# Patient Record
Sex: FEMALE | Race: BLACK OR AFRICAN AMERICAN | NOT HISPANIC OR LATINO | Employment: OTHER | URBAN - METROPOLITAN AREA
[De-identification: names, ages, dates, MRNs, and addresses within clinical notes are randomized per-mention and may not be internally consistent; named-entity substitution may affect disease eponyms.]

---

## 2023-03-02 PROBLEM — M54.50 LOWER BACK PAIN: Status: ACTIVE | Noted: 2023-03-02

## 2023-03-02 PROBLEM — J20.9 ACUTE BRONCHITIS WITH BRONCHOSPASM: Status: ACTIVE | Noted: 2023-03-02

## 2023-03-02 PROBLEM — M25.569 JOINT PAIN, KNEE: Status: RESOLVED | Noted: 2023-03-02 | Resolved: 2023-03-02

## 2023-03-02 PROBLEM — E87.6 HYPOKALEMIA: Status: ACTIVE | Noted: 2023-03-02

## 2023-03-02 PROBLEM — M25.569 JOINT PAIN, KNEE: Status: ACTIVE | Noted: 2023-03-02

## 2023-03-02 PROBLEM — K22.70 BARRETT'S ESOPHAGUS WITHOUT DYSPLASIA: Status: ACTIVE | Noted: 2023-03-02

## 2023-03-02 PROBLEM — N39.0 UTI (URINARY TRACT INFECTION): Status: ACTIVE | Noted: 2023-03-02

## 2023-03-02 PROBLEM — I50.9 CHF (CONGESTIVE HEART FAILURE) (MULTI): Status: ACTIVE | Noted: 2023-03-02

## 2023-03-02 PROBLEM — R79.1 SUBTHERAPEUTIC INTERNATIONAL NORMALIZED RATIO (INR): Status: ACTIVE | Noted: 2023-03-02

## 2023-03-02 PROBLEM — J40 BRONCHITIS: Status: ACTIVE | Noted: 2023-03-02

## 2023-03-02 PROBLEM — G47.30 SLEEP DISORDER BREATHING: Status: ACTIVE | Noted: 2023-03-02

## 2023-03-02 PROBLEM — M25.572 LEFT ANKLE PAIN: Status: ACTIVE | Noted: 2023-03-02

## 2023-03-02 PROBLEM — N18.30 STAGE III CHRONIC KIDNEY DISEASE (MULTI): Status: ACTIVE | Noted: 2023-03-02

## 2023-03-02 PROBLEM — I48.91 ATRIAL FIBRILLATION (MULTI): Status: ACTIVE | Noted: 2023-03-02

## 2023-03-02 PROBLEM — J32.9 SINUSITIS, BACTERIAL: Status: ACTIVE | Noted: 2023-03-02

## 2023-03-02 PROBLEM — S80.00XA CONTUSION OF KNEE: Status: RESOLVED | Noted: 2023-03-02 | Resolved: 2023-03-02

## 2023-03-02 PROBLEM — R25.2 LEG CRAMPS: Status: ACTIVE | Noted: 2023-03-02

## 2023-03-02 PROBLEM — E78.49 FAMILIAL COMBINED HYPERLIPIDEMIA: Status: ACTIVE | Noted: 2023-03-02

## 2023-03-02 PROBLEM — I10 HYPERTENSION: Status: ACTIVE | Noted: 2023-03-02

## 2023-03-02 PROBLEM — G47.33 OSA (OBSTRUCTIVE SLEEP APNEA): Status: ACTIVE | Noted: 2023-03-02

## 2023-03-02 PROBLEM — B02.29 NEURALGIA, POST-HERPETIC: Status: ACTIVE | Noted: 2023-03-02

## 2023-03-02 PROBLEM — B37.0 CANDIDIASIS OF MOUTH: Status: RESOLVED | Noted: 2023-03-02 | Resolved: 2023-03-02

## 2023-03-02 PROBLEM — Z96.652 S/P TKR (TOTAL KNEE REPLACEMENT) USING CEMENT, LEFT: Status: ACTIVE | Noted: 2023-03-02

## 2023-03-02 PROBLEM — M71.20 BAKERS CYST: Status: ACTIVE | Noted: 2023-03-02

## 2023-03-02 PROBLEM — T82.867A: Status: ACTIVE | Noted: 2023-03-02

## 2023-03-02 PROBLEM — B37.0 CANDIDIASIS OF MOUTH: Status: ACTIVE | Noted: 2023-03-02

## 2023-03-02 PROBLEM — Z86.718 HISTORY OF BLOOD CLOTS: Status: ACTIVE | Noted: 2023-03-02

## 2023-03-02 PROBLEM — I82.90 THROMBUS: Status: ACTIVE | Noted: 2023-03-02

## 2023-03-02 PROBLEM — R60.0 LEG EDEMA: Status: ACTIVE | Noted: 2023-03-02

## 2023-03-02 PROBLEM — N64.4 BREAST TENDERNESS: Status: ACTIVE | Noted: 2023-03-02

## 2023-03-02 PROBLEM — L03.90 CELLULITIS: Status: ACTIVE | Noted: 2023-03-02

## 2023-03-02 PROBLEM — Z98.84 BARIATRIC SURGERY STATUS: Status: ACTIVE | Noted: 2023-03-02

## 2023-03-02 PROBLEM — R25.2 HAND CRAMPS: Status: ACTIVE | Noted: 2023-03-02

## 2023-03-02 PROBLEM — R06.83 SNORING: Status: ACTIVE | Noted: 2023-03-02

## 2023-03-02 PROBLEM — M17.11 OSTEOARTHROSIS, LOCALIZED, PRIMARY, KNEE, RIGHT: Status: ACTIVE | Noted: 2023-03-02

## 2023-03-02 PROBLEM — B96.89 SINUSITIS, BACTERIAL: Status: ACTIVE | Noted: 2023-03-02

## 2023-03-02 PROBLEM — N95.1 HOT FLASHES DUE TO MENOPAUSE: Status: ACTIVE | Noted: 2023-03-02

## 2023-03-02 PROBLEM — I82.409 ACUTE EMBOLISM AND THROMBOSIS OF UNSPECIFIED DEEP VEINS OF UNSPECIFIED LOWER EXTREMITY (MULTI): Status: ACTIVE | Noted: 2023-03-02

## 2023-03-02 PROBLEM — M25.512 LEFT SHOULDER PAIN: Status: ACTIVE | Noted: 2023-03-02

## 2023-03-02 PROBLEM — R61 NIGHT SWEATS: Status: ACTIVE | Noted: 2023-03-02

## 2023-03-02 PROBLEM — I26.99 PULMONARY EMBOLISM (MULTI): Status: ACTIVE | Noted: 2023-03-02

## 2023-03-02 PROBLEM — M76.829 POSTERIOR TIBIALIS TENDON INSUFFICIENCY: Status: ACTIVE | Noted: 2023-03-02

## 2023-03-02 PROBLEM — S90.01XA CONTUSION OF ANKLE, RIGHT: Status: ACTIVE | Noted: 2023-03-02

## 2023-03-02 PROBLEM — M19.90 OSTEOARTHROSIS: Status: RESOLVED | Noted: 2023-03-02 | Resolved: 2023-03-02

## 2023-03-02 PROBLEM — J01.90 ACUTE SINUSITIS: Status: ACTIVE | Noted: 2023-03-02

## 2023-03-02 PROBLEM — S90.00XA CONTUSION OF ANKLE: Status: RESOLVED | Noted: 2023-03-02 | Resolved: 2023-03-02

## 2023-03-02 PROBLEM — I87.8 CHRONIC VENOUS STASIS: Status: ACTIVE | Noted: 2023-03-02

## 2023-03-02 PROBLEM — B37.0 THRUSH, ORAL: Status: ACTIVE | Noted: 2023-03-02

## 2023-03-02 PROBLEM — L25.9 CONTACT DERMATITIS: Status: ACTIVE | Noted: 2023-03-02

## 2023-03-02 PROBLEM — F11.90 OPIATE USE: Status: ACTIVE | Noted: 2023-03-02

## 2023-03-02 PROBLEM — S90.00XA CONTUSION OF ANKLE: Status: ACTIVE | Noted: 2023-03-02

## 2023-03-02 PROBLEM — F54 PSYCHOLOGICAL FACTORS AFFECTING MEDICAL CONDITION: Status: ACTIVE | Noted: 2023-03-02

## 2023-03-02 PROBLEM — M23.305 DERANGEMENT OF MEDIAL MENISCUS: Status: ACTIVE | Noted: 2023-03-02

## 2023-03-02 PROBLEM — E08.311 ADVANCED DIABETIC MACULOPATHY WITH RETINOPATHY AND MACULAR EDEMA ASSOCIATED WITH DIABETES MELLITUS DUE TO UNDERLYING CONDITION (MULTI): Status: ACTIVE | Noted: 2023-03-02

## 2023-03-02 PROBLEM — S80.02XA CONTUSION OF KNEE, LEFT: Status: ACTIVE | Noted: 2023-03-02

## 2023-03-02 PROBLEM — R12 HEARTBURN: Status: ACTIVE | Noted: 2023-03-02

## 2023-03-02 PROBLEM — K04.7 TOOTH INFECTION: Status: ACTIVE | Noted: 2023-03-02

## 2023-03-02 PROBLEM — U07.1 COVID-19 VIRUS INFECTION: Status: ACTIVE | Noted: 2023-03-02

## 2023-03-02 PROBLEM — M79.603 ARM PAIN: Status: ACTIVE | Noted: 2023-03-02

## 2023-03-02 PROBLEM — E78.5 HYPERLIPIDEMIA: Status: ACTIVE | Noted: 2023-03-02
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
## 2023-03-02 PROBLEM — B49 FUNGAL INFECTION: Status: ACTIVE | Noted: 2023-03-02

## 2023-03-02 PROBLEM — E66.01 OBESITY, MORBID (MULTI): Status: ACTIVE | Noted: 2023-03-02

## 2023-03-02 PROBLEM — M23.302 DERANGEMENT OF LATERAL MENISCUS: Status: ACTIVE | Noted: 2023-03-02

## 2023-03-02 PROBLEM — B49 INFECTION DUE TO FUNGUS: Status: ACTIVE | Noted: 2023-03-02

## 2023-03-02 PROBLEM — J30.9 ALLERGIC RHINITIS: Status: ACTIVE | Noted: 2023-03-02

## 2023-03-02 PROBLEM — M19.90 OSTEOARTHROSIS: Status: ACTIVE | Noted: 2023-03-02

## 2023-03-02 PROBLEM — R53.83 FATIGUE: Status: ACTIVE | Noted: 2023-03-02

## 2023-03-02 PROBLEM — B49 INFECTION DUE TO FUNGUS: Status: RESOLVED | Noted: 2023-03-02 | Resolved: 2023-03-02

## 2023-03-02 PROBLEM — R60.0 EDEMA OF LOWER EXTREMITY: Status: ACTIVE | Noted: 2023-03-02

## 2023-03-02 PROBLEM — K02.9 DENTAL CARIES: Status: ACTIVE | Noted: 2023-03-02

## 2023-03-02 PROBLEM — B02.9 SHINGLES: Status: ACTIVE | Noted: 2023-03-02

## 2023-03-02 PROBLEM — D68.9 COAGULOPATHY (MULTI): Status: ACTIVE | Noted: 2023-03-02

## 2023-03-02 PROBLEM — S80.00XA CONTUSION OF KNEE: Status: ACTIVE | Noted: 2023-03-02

## 2023-03-02 PROBLEM — N64.4 BREAST PAIN, RIGHT: Status: ACTIVE | Noted: 2023-03-02

## 2023-03-02 PROBLEM — E53.8 VITAMIN B12 DEFICIENCY: Status: ACTIVE | Noted: 2023-03-02

## 2023-03-02 PROBLEM — K44.9 HERNIA, HIATAL: Status: ACTIVE | Noted: 2023-03-02

## 2023-03-02 PROBLEM — M17.9 OSTEOARTHRITIS OF KNEE: Status: ACTIVE | Noted: 2023-03-02

## 2023-03-02 PROBLEM — A08.4 VIRAL GASTROENTERITIS: Status: ACTIVE | Noted: 2023-03-02

## 2023-03-02 PROBLEM — G47.19 EXCESSIVE DAYTIME SLEEPINESS: Status: ACTIVE | Noted: 2023-03-02

## 2023-03-02 RX ORDER — OMEPRAZOLE 20 MG/1
1 CAPSULE, DELAYED RELEASE ORAL DAILY
COMMUNITY
Start: 2020-02-06 | End: 2023-03-14 | Stop reason: SDUPTHER

## 2023-03-02 RX ORDER — ELECTROLYTES/DEXTROSE
1 SOLUTION, ORAL ORAL 2 TIMES DAILY
COMMUNITY

## 2023-03-02 RX ORDER — AMLODIPINE BESYLATE 10 MG/1
1 TABLET ORAL DAILY
COMMUNITY
Start: 2015-01-07 | End: 2023-05-17

## 2023-03-02 RX ORDER — TIZANIDINE 4 MG/1
1 TABLET ORAL 2 TIMES DAILY
COMMUNITY
Start: 2022-07-06 | End: 2023-05-03 | Stop reason: SDUPTHER

## 2023-03-02 RX ORDER — WARFARIN SODIUM 5 MG/1
5 TABLET ORAL DAILY
COMMUNITY
Start: 2021-04-27 | End: 2023-05-03 | Stop reason: SDUPTHER

## 2023-03-02 RX ORDER — POTASSIUM CHLORIDE 20 MEQ/1
2 TABLET, EXTENDED RELEASE ORAL 2 TIMES DAILY
COMMUNITY
Start: 2014-03-13 | End: 2024-02-15 | Stop reason: SDUPTHER

## 2023-03-02 RX ORDER — CLONIDINE HYDROCHLORIDE 0.2 MG/1
1 TABLET ORAL 2 TIMES DAILY
COMMUNITY
Start: 2014-07-28 | End: 2023-05-17

## 2023-03-02 RX ORDER — FLUTICASONE PROPIONATE 50 MCG
1 SPRAY, SUSPENSION (ML) NASAL 2 TIMES DAILY
COMMUNITY
Start: 2019-04-05 | End: 2023-10-10 | Stop reason: SDUPTHER

## 2023-03-02 RX ORDER — METHYLPREDNISOLONE 4 MG/1
TABLET ORAL
COMMUNITY
End: 2023-07-18 | Stop reason: ALTCHOICE

## 2023-03-02 RX ORDER — ALBUTEROL SULFATE 90 UG/1
2 AEROSOL, METERED RESPIRATORY (INHALATION)
COMMUNITY
Start: 2017-09-05 | End: 2023-10-30 | Stop reason: SDUPTHER

## 2023-03-02 RX ORDER — ASPIRIN 81 MG/1
1 TABLET ORAL DAILY
COMMUNITY
Start: 2012-02-23

## 2023-03-02 RX ORDER — HYDROCHLOROTHIAZIDE 25 MG/1
1 TABLET ORAL DAILY
COMMUNITY
Start: 2012-07-30 | End: 2023-03-30 | Stop reason: SDUPTHER

## 2023-03-02 RX ORDER — ATORVASTATIN CALCIUM 10 MG/1
1 TABLET, FILM COATED ORAL DAILY
COMMUNITY
Start: 2019-02-18

## 2023-03-02 RX ORDER — MINERAL OIL
180 ENEMA (ML) RECTAL DAILY
COMMUNITY

## 2023-03-02 RX ORDER — SPIRONOLACTONE 25 MG/1
1 TABLET ORAL DAILY
COMMUNITY
Start: 2021-04-30 | End: 2023-05-24 | Stop reason: SDUPTHER

## 2023-03-14 DIAGNOSIS — I10 HYPERTENSION, UNSPECIFIED TYPE: ICD-10-CM

## 2023-03-14 DIAGNOSIS — R12 HEARTBURN: ICD-10-CM

## 2023-03-15 RX ORDER — OMEPRAZOLE 20 MG/1
20 CAPSULE, DELAYED RELEASE ORAL DAILY
Qty: 90 CAPSULE | Refills: 3 | Status: SHIPPED | OUTPATIENT
Start: 2023-03-15

## 2023-03-29 ENCOUNTER — APPOINTMENT (OUTPATIENT)
Dept: PRIMARY CARE | Facility: CLINIC | Age: 68
End: 2023-03-29
Payer: MEDICARE

## 2023-03-29 DIAGNOSIS — I10 HYPERTENSION, UNSPECIFIED TYPE: ICD-10-CM

## 2023-03-29 LAB
ANION GAP IN SER/PLAS: 16 MMOL/L (ref 10–20)
CALCIUM (MG/DL) IN SER/PLAS: 9.9 MG/DL (ref 8.6–10.6)
CARBON DIOXIDE, TOTAL (MMOL/L) IN SER/PLAS: 24 MMOL/L (ref 21–32)
CHLORIDE (MMOL/L) IN SER/PLAS: 106 MMOL/L (ref 98–107)
CREATININE (MG/DL) IN SER/PLAS: 1.17 MG/DL (ref 0.5–1.05)
GFR FEMALE: 51 ML/MIN/1.73M2
GLUCOSE (MG/DL) IN SER/PLAS: 84 MG/DL (ref 74–99)
POTASSIUM (MMOL/L) IN SER/PLAS: 4 MMOL/L (ref 3.5–5.3)
SODIUM (MMOL/L) IN SER/PLAS: 142 MMOL/L (ref 136–145)
UREA NITROGEN (MG/DL) IN SER/PLAS: 15 MG/DL (ref 6–23)

## 2023-03-29 PROCEDURE — 36415 COLL VENOUS BLD VENIPUNCTURE: CPT | Performed by: INTERNAL MEDICINE

## 2023-03-29 PROCEDURE — 80048 BASIC METABOLIC PNL TOTAL CA: CPT

## 2023-03-30 ENCOUNTER — APPOINTMENT (OUTPATIENT)
Dept: PRIMARY CARE | Facility: CLINIC | Age: 68
End: 2023-03-30
Payer: MEDICARE

## 2023-03-30 DIAGNOSIS — I10 HYPERTENSION, UNSPECIFIED TYPE: ICD-10-CM

## 2023-03-31 RX ORDER — HYDROCHLOROTHIAZIDE 25 MG/1
25 TABLET ORAL DAILY
Qty: 90 TABLET | Refills: 2 | Status: SHIPPED | OUTPATIENT
Start: 2023-03-31 | End: 2023-10-30 | Stop reason: ALTCHOICE

## 2023-04-12 ENCOUNTER — OFFICE VISIT (OUTPATIENT)
Dept: PRIMARY CARE | Facility: CLINIC | Age: 68
End: 2023-04-12
Payer: MEDICARE

## 2023-04-12 VITALS
HEART RATE: 76 BPM | WEIGHT: 201 LBS | RESPIRATION RATE: 12 BRPM | HEIGHT: 65 IN | DIASTOLIC BLOOD PRESSURE: 78 MMHG | OXYGEN SATURATION: 98 % | SYSTOLIC BLOOD PRESSURE: 124 MMHG | BODY MASS INDEX: 33.49 KG/M2

## 2023-04-12 DIAGNOSIS — G47.33 OSA (OBSTRUCTIVE SLEEP APNEA): ICD-10-CM

## 2023-04-12 DIAGNOSIS — I50.9 CONGESTIVE HEART FAILURE, UNSPECIFIED HF CHRONICITY, UNSPECIFIED HEART FAILURE TYPE (MULTI): ICD-10-CM

## 2023-04-12 DIAGNOSIS — I48.91 ATRIAL FIBRILLATION, UNSPECIFIED TYPE (MULTI): ICD-10-CM

## 2023-04-12 DIAGNOSIS — Z51.81 ENCOUNTER FOR MONITORING COUMADIN THERAPY: ICD-10-CM

## 2023-04-12 DIAGNOSIS — I82.409 ACUTE EMBOLISM AND THROMBOSIS OF DEEP VEIN OF LOWER EXTREMITY, UNSPECIFIED LATERALITY (MULTI): Primary | ICD-10-CM

## 2023-04-12 DIAGNOSIS — I15.9 SECONDARY HYPERTENSION: ICD-10-CM

## 2023-04-12 DIAGNOSIS — E78.5 HYPERLIPIDEMIA, UNSPECIFIED HYPERLIPIDEMIA TYPE: ICD-10-CM

## 2023-04-12 DIAGNOSIS — Z79.01 ENCOUNTER FOR MONITORING COUMADIN THERAPY: ICD-10-CM

## 2023-04-12 LAB
POC INR: 2.1 (ref 0.9–1.1)
POC PROTHROMBIN TIME: 24.8 (ref 9.3–12.5)

## 2023-04-12 PROCEDURE — 3074F SYST BP LT 130 MM HG: CPT | Performed by: INTERNAL MEDICINE

## 2023-04-12 PROCEDURE — 85610 PROTHROMBIN TIME: CPT | Performed by: INTERNAL MEDICINE

## 2023-04-12 PROCEDURE — 99214 OFFICE O/P EST MOD 30 MIN: CPT | Performed by: INTERNAL MEDICINE

## 2023-04-12 PROCEDURE — 3078F DIAST BP <80 MM HG: CPT | Performed by: INTERNAL MEDICINE

## 2023-04-12 PROCEDURE — 1159F MED LIST DOCD IN RCRD: CPT | Performed by: INTERNAL MEDICINE

## 2023-04-12 PROCEDURE — 1036F TOBACCO NON-USER: CPT | Performed by: INTERNAL MEDICINE

## 2023-04-12 ASSESSMENT — PAIN SCALES - GENERAL: PAINLEVEL: 0-NO PAIN

## 2023-04-12 NOTE — PROGRESS NOTES
"Subjective   Chief complaint: Halima Mckeon is a 67 y.o. female who presents for Blood work followup  and INR check .    HPI:  Patient is here for a Coumadin treatment patient has A-fib with history of PE and a DVT and she has been on Coumadin lifetime her INR is 2.0 therapeutic  Patient complaining of severe arthritic pain in her knee and her hip and she would like to have a Depo-Medrol shot.  Patient also she is complaining of being weak and she would like to have a B12 with a history of B12 anemia        Objective   /78 (BP Location: Right arm, Patient Position: Sitting, BP Cuff Size: Adult)   Pulse 76   Resp 12   Ht 1.651 m (5' 5\")   Wt 91.2 kg (201 lb)   SpO2 98%   BMI 33.45 kg/m²   Physical Exam  Vitals reviewed.   Constitutional:       Appearance: Normal appearance.   HENT:      Head: Normocephalic and atraumatic.   Cardiovascular:      Rate and Rhythm: Normal rate and regular rhythm.   Pulmonary:      Effort: Pulmonary effort is normal.      Breath sounds: Normal breath sounds.   Abdominal:      General: Bowel sounds are normal.      Palpations: Abdomen is soft.   Musculoskeletal:      Cervical back: Neck supple.   Skin:     General: Skin is warm and dry.   Neurological:      General: No focal deficit present.      Mental Status: She is alert.   Psychiatric:         Mood and Affect: Mood normal.         Behavior: Behavior is cooperative.         I have reviewed and reconciled the medication list with the patient today.   Current Outpatient Medications:     albuterol 90 mcg/actuation inhaler, Inhale 2 puffs. EVERY FOUR TO SIX HOURS AS NEEDED, Disp: , Rfl:     amLODIPine (Norvasc) 10 mg tablet, Take 1 tablet (10 mg) by mouth once daily., Disp: , Rfl:     aspirin 81 mg EC tablet, Take 1 tablet (81 mg) by mouth once daily., Disp: , Rfl:     atorvastatin (Lipitor) 10 mg tablet, Take 1 tablet (10 mg) by mouth once daily., Disp: , Rfl:     cloNIDine (Catapres) 0.2 mg tablet, Take 1 tablet (0.2 mg) by " mouth in the morning and 1 tablet (0.2 mg) before bedtime., Disp: , Rfl:     fexofenadine (Allegra) 180 mg tablet, Take 1 tablet (180 mg) by mouth once daily., Disp: , Rfl:     fluticasone (Flonase) 50 mcg/actuation nasal spray, Administer 1 spray into each nostril in the morning and 1 spray before bedtime., Disp: , Rfl:     hydroCHLOROthiazide (HYDRODiuril) 25 mg tablet, Take 1 tablet (25 mg) by mouth once daily., Disp: 90 tablet, Rfl: 2    hydrocortisone (hydrocortisone-aloe vera) 1 % cream, Apply 1 Application topically in the morning and 1 Application before bedtime., Disp: , Rfl:     potassium chloride CR (Klor-Con M20) 20 mEq ER tablet, Take 2 tablets (40 mEq) by mouth in the morning and 2 tablets (40 mEq) before bedtime., Disp: , Rfl:     spironolactone (Aldactone) 25 mg tablet, Take 1 tablet (25 mg) by mouth once daily., Disp: , Rfl:     tiZANidine (Zanaflex) 4 mg tablet, Take 1 tablet (4 mg) by mouth in the morning and 1 tablet (4 mg) before bedtime., Disp: , Rfl:     warfarin (Coumadin) 5 mg tablet, Take 1 tablet (5 mg) by mouth daily. TAKE 1 1/2 TABS (7.5MG) EVERY TUES AND 1 TAB (5MG) THE REST OF THE WEEK, Disp: , Rfl:     methylPREDNISolone (Medrol) 4 mg tablet, Take by mouth., Disp: , Rfl:     omeprazole (PriLOSEC) 20 mg DR capsule, Take 1 capsule (20 mg) by mouth once daily. (Patient not taking: Reported on 4/12/2023), Disp: 90 capsule, Rfl: 3     Imaging:  No results found.     Labs reviewed:    Lab Results   Component Value Date    WBC 6.7 01/11/2023    HGB 13.2 01/11/2023    HCT 42.1 01/11/2023     01/11/2023    CHOL 201 (H) 01/11/2023    TRIG 86 01/11/2023    HDL 60.8 01/11/2023    ALT 17 01/11/2023    AST 20 01/11/2023     03/29/2023    K 4.0 03/29/2023     03/29/2023    CREATININE 1.17 (H) 03/29/2023    BUN 15 03/29/2023    CO2 24 03/29/2023    TSH 1.07 05/14/2022    INR 2.1 (A) 04/12/2023    HGBA1C 5.8 07/27/2021       Assessment/Plan   Problem List Items Addressed This Visit           Nervous    MENG (obstructive sleep apnea)       Circulatory    Acute embolism and thrombosis of unspecified deep veins of unspecified lower extremity (CMS/HCC) - Primary     Continue Coumadin         Atrial fibrillation (CMS/HCC)     INR therapeutic continue the same Coumadin dose  INR in 1 month         CHF (congestive heart failure) (CMS/HCC)     Follow low-salt diet  Continue Lasix  Fluid restriction  Elevate the         Hypertension     Blood pressure under control  Amlodipine and  Clonidine  Aldactone  Low-salt diet  Fluid restriction            Other    Hyperlipidemia     Low-fat diet  Statin         Encounter for monitoring Coumadin therapy    Relevant Orders    POCT INR manually resulted (Completed)       Continue current medications as listed  Follow up in In May for  complete physical  INR

## 2023-04-12 NOTE — ASSESSMENT & PLAN NOTE
Blood pressure under control  Amlodipine and  Clonidine  Aldactone  Low-salt diet  Fluid restriction

## 2023-05-03 DIAGNOSIS — I48.91 ATRIAL FIBRILLATION, UNSPECIFIED TYPE (MULTI): ICD-10-CM

## 2023-05-03 DIAGNOSIS — M54.50 LOW BACK PAIN, UNSPECIFIED BACK PAIN LATERALITY, UNSPECIFIED CHRONICITY, UNSPECIFIED WHETHER SCIATICA PRESENT: ICD-10-CM

## 2023-05-03 RX ORDER — TIZANIDINE 4 MG/1
4 TABLET ORAL 2 TIMES DAILY
Qty: 30 TABLET | Refills: 1 | Status: SHIPPED | OUTPATIENT
Start: 2023-05-03 | End: 2023-10-30 | Stop reason: SDUPTHER

## 2023-05-03 RX ORDER — WARFARIN SODIUM 5 MG/1
5 TABLET ORAL NIGHTLY
Qty: 90 TABLET | Refills: 3 | Status: SHIPPED | OUTPATIENT
Start: 2023-05-03 | End: 2023-05-17 | Stop reason: SDUPTHER

## 2023-05-16 DIAGNOSIS — I48.91 ATRIAL FIBRILLATION, UNSPECIFIED TYPE (MULTI): ICD-10-CM

## 2023-05-16 DIAGNOSIS — I15.9 SECONDARY HYPERTENSION: ICD-10-CM

## 2023-05-17 RX ORDER — AMLODIPINE BESYLATE 10 MG/1
TABLET ORAL
Qty: 90 TABLET | Refills: 3 | Status: SHIPPED | OUTPATIENT
Start: 2023-05-17 | End: 2023-10-30 | Stop reason: SDUPTHER

## 2023-05-17 RX ORDER — CLONIDINE HYDROCHLORIDE 0.2 MG/1
TABLET ORAL
Qty: 180 TABLET | Refills: 3 | Status: SHIPPED | OUTPATIENT
Start: 2023-05-17

## 2023-05-17 RX ORDER — WARFARIN SODIUM 5 MG/1
5 TABLET ORAL NIGHTLY
Qty: 90 TABLET | Refills: 3 | Status: SHIPPED | OUTPATIENT
Start: 2023-05-17 | End: 2023-07-19 | Stop reason: SDUPTHER

## 2023-05-18 ENCOUNTER — APPOINTMENT (OUTPATIENT)
Dept: PRIMARY CARE | Facility: CLINIC | Age: 68
End: 2023-05-18
Payer: MEDICARE

## 2023-05-23 DIAGNOSIS — E55.9 VITAMIN D DEFICIENCY: ICD-10-CM

## 2023-05-23 DIAGNOSIS — N18.31 STAGE 3A CHRONIC KIDNEY DISEASE (MULTI): ICD-10-CM

## 2023-05-23 DIAGNOSIS — Z00.00 ENCOUNTER FOR SUBSEQUENT ANNUAL WELLNESS VISIT (AWV) IN MEDICARE PATIENT: ICD-10-CM

## 2023-05-23 DIAGNOSIS — I50.9 CONGESTIVE HEART FAILURE, UNSPECIFIED HF CHRONICITY, UNSPECIFIED HEART FAILURE TYPE (MULTI): ICD-10-CM

## 2023-05-24 DIAGNOSIS — I15.9 SECONDARY HYPERTENSION: ICD-10-CM

## 2023-05-24 RX ORDER — SPIRONOLACTONE 25 MG/1
25 TABLET ORAL DAILY
Qty: 90 TABLET | Refills: 3 | Status: SHIPPED | OUTPATIENT
Start: 2023-05-24 | End: 2023-10-30 | Stop reason: ALTCHOICE

## 2023-05-25 ENCOUNTER — APPOINTMENT (OUTPATIENT)
Dept: PRIMARY CARE | Facility: CLINIC | Age: 68
End: 2023-05-25
Payer: MEDICARE

## 2023-06-05 ENCOUNTER — APPOINTMENT (OUTPATIENT)
Dept: PRIMARY CARE | Facility: CLINIC | Age: 68
End: 2023-06-05
Payer: MEDICARE

## 2023-06-06 DIAGNOSIS — Z00.00 ENCOUNTER FOR SUBSEQUENT ANNUAL WELLNESS VISIT (AWV) IN MEDICARE PATIENT: ICD-10-CM

## 2023-06-12 ENCOUNTER — APPOINTMENT (OUTPATIENT)
Dept: PRIMARY CARE | Facility: CLINIC | Age: 68
End: 2023-06-12
Payer: MEDICARE

## 2023-06-12 DIAGNOSIS — I50.9 CONGESTIVE HEART FAILURE, UNSPECIFIED HF CHRONICITY, UNSPECIFIED HEART FAILURE TYPE (MULTI): ICD-10-CM

## 2023-06-12 DIAGNOSIS — Z00.00 ENCOUNTER FOR SUBSEQUENT ANNUAL WELLNESS VISIT (AWV) IN MEDICARE PATIENT: ICD-10-CM

## 2023-06-12 DIAGNOSIS — E08.311 ADVANCED DIABETIC MACULOPATHY WITH RETINOPATHY AND MACULAR EDEMA ASSOCIATED WITH DIABETES MELLITUS DUE TO UNDERLYING CONDITION (MULTI): ICD-10-CM

## 2023-06-12 DIAGNOSIS — N18.31 STAGE 3A CHRONIC KIDNEY DISEASE (MULTI): ICD-10-CM

## 2023-06-12 DIAGNOSIS — I15.9 SECONDARY HYPERTENSION: ICD-10-CM

## 2023-06-26 DIAGNOSIS — I48.91 ATRIAL FIBRILLATION, UNSPECIFIED TYPE (MULTI): ICD-10-CM

## 2023-06-26 DIAGNOSIS — Z00.00 ENCOUNTER FOR SUBSEQUENT ANNUAL WELLNESS VISIT (AWV) IN MEDICARE PATIENT: ICD-10-CM

## 2023-06-26 DIAGNOSIS — E55.9 VITAMIN D DEFICIENCY: ICD-10-CM

## 2023-06-26 DIAGNOSIS — I50.9 CONGESTIVE HEART FAILURE, UNSPECIFIED HF CHRONICITY, UNSPECIFIED HEART FAILURE TYPE (MULTI): ICD-10-CM

## 2023-06-27 ENCOUNTER — APPOINTMENT (OUTPATIENT)
Dept: PRIMARY CARE | Facility: CLINIC | Age: 68
End: 2023-06-27
Payer: MEDICARE

## 2023-07-18 ENCOUNTER — OFFICE VISIT (OUTPATIENT)
Dept: PRIMARY CARE | Facility: CLINIC | Age: 68
End: 2023-07-18
Payer: MEDICARE

## 2023-07-18 VITALS
SYSTOLIC BLOOD PRESSURE: 120 MMHG | BODY MASS INDEX: 33.12 KG/M2 | HEART RATE: 82 BPM | RESPIRATION RATE: 18 BRPM | WEIGHT: 199 LBS | OXYGEN SATURATION: 95 % | DIASTOLIC BLOOD PRESSURE: 90 MMHG | TEMPERATURE: 97.6 F

## 2023-07-18 DIAGNOSIS — I15.9 SECONDARY HYPERTENSION: ICD-10-CM

## 2023-07-18 DIAGNOSIS — N18.32 STAGE 3B CHRONIC KIDNEY DISEASE (MULTI): ICD-10-CM

## 2023-07-18 DIAGNOSIS — I50.9 CONGESTIVE HEART FAILURE, UNSPECIFIED HF CHRONICITY, UNSPECIFIED HEART FAILURE TYPE (MULTI): Primary | ICD-10-CM

## 2023-07-18 DIAGNOSIS — I48.91 ATRIAL FIBRILLATION, UNSPECIFIED TYPE (MULTI): ICD-10-CM

## 2023-07-18 DIAGNOSIS — Z86.718 HISTORY OF BLOOD CLOTS: ICD-10-CM

## 2023-07-18 DIAGNOSIS — G89.29 CHRONIC PAIN OF LEFT ANKLE: ICD-10-CM

## 2023-07-18 DIAGNOSIS — Z00.00 ENCOUNTER FOR SUBSEQUENT ANNUAL WELLNESS VISIT (AWV) IN MEDICARE PATIENT: ICD-10-CM

## 2023-07-18 DIAGNOSIS — M25.572 CHRONIC PAIN OF LEFT ANKLE: ICD-10-CM

## 2023-07-18 DIAGNOSIS — E78.5 HYPERLIPIDEMIA, UNSPECIFIED HYPERLIPIDEMIA TYPE: ICD-10-CM

## 2023-07-18 LAB
POC APPEARANCE, URINE: CLEAR
POC BILIRUBIN, URINE: NEGATIVE
POC BLOOD, URINE: NEGATIVE
POC COLOR, URINE: YELLOW
POC GLUCOSE, URINE: NEGATIVE MG/DL
POC INR: 2.5 (ref 0.9–1.1)
POC KETONES, URINE: NEGATIVE MG/DL
POC LEUKOCYTES, URINE: NEGATIVE
POC NITRITE,URINE: NEGATIVE
POC PH, URINE: 5 PH
POC PROTEIN, URINE: NEGATIVE MG/DL
POC PROTHROMBIN TIME: 29.9 (ref 9.3–12.5)
POC SPECIFIC GRAVITY, URINE: <=1.005
POC UROBILINOGEN, URINE: 0.2 EU/DL

## 2023-07-18 PROCEDURE — G0439 PPPS, SUBSEQ VISIT: HCPCS | Performed by: INTERNAL MEDICINE

## 2023-07-18 PROCEDURE — 3080F DIAST BP >= 90 MM HG: CPT | Performed by: INTERNAL MEDICINE

## 2023-07-18 PROCEDURE — 93000 ELECTROCARDIOGRAM COMPLETE: CPT | Performed by: INTERNAL MEDICINE

## 2023-07-18 PROCEDURE — 1125F AMNT PAIN NOTED PAIN PRSNT: CPT | Performed by: INTERNAL MEDICINE

## 2023-07-18 PROCEDURE — 1036F TOBACCO NON-USER: CPT | Performed by: INTERNAL MEDICINE

## 2023-07-18 PROCEDURE — 85610 PROTHROMBIN TIME: CPT | Performed by: INTERNAL MEDICINE

## 2023-07-18 PROCEDURE — 1159F MED LIST DOCD IN RCRD: CPT | Performed by: INTERNAL MEDICINE

## 2023-07-18 PROCEDURE — 99214 OFFICE O/P EST MOD 30 MIN: CPT | Performed by: INTERNAL MEDICINE

## 2023-07-18 PROCEDURE — 3074F SYST BP LT 130 MM HG: CPT | Performed by: INTERNAL MEDICINE

## 2023-07-18 PROCEDURE — 81002 URINALYSIS NONAUTO W/O SCOPE: CPT | Performed by: INTERNAL MEDICINE

## 2023-07-18 ASSESSMENT — ENCOUNTER SYMPTOMS
DIZZINESS: 0
POLYDIPSIA: 0
HEADACHES: 0
EYE PAIN: 0
FEVER: 0
SHORTNESS OF BREATH: 0
DYSURIA: 0
ARTHRALGIAS: 1
HEMATURIA: 0
SORE THROAT: 0
PALPITATIONS: 0
WOUND: 0
ABDOMINAL PAIN: 0
MYALGIAS: 0
COUGH: 0
BLOOD IN STOOL: 0
FATIGUE: 0

## 2023-07-18 ASSESSMENT — PAIN SCALES - GENERAL: PAINLEVEL: 8

## 2023-07-18 NOTE — PROGRESS NOTES
Subjective :  Chief Complaint: Halima Mckeon is an 67 y.o. female here for an annual wellness visit and general medical care and f/u.     HPI:  Pt here today for annual physical exam. She had blood work completed, urine screening done in the office today. PT is on 5mg Warfarin therapy daily, INR 2.5 today. Only other medical concern is bilateral ankle swelling.        Review of Systems   Constitutional:  Negative for fatigue and fever.   HENT:  Negative for congestion and sore throat.    Eyes:  Negative for pain and visual disturbance.   Respiratory:  Negative for cough and shortness of breath.    Cardiovascular:  Positive for leg swelling. Negative for chest pain and palpitations.   Gastrointestinal:  Negative for abdominal pain and blood in stool.   Endocrine: Negative for polydipsia and polyuria.   Genitourinary:  Negative for dysuria and hematuria.   Musculoskeletal:  Positive for arthralgias. Negative for myalgias.   Skin:  Negative for rash and wound.   Neurological:  Negative for dizziness and headaches.   Psychiatric/Behavioral:  Negative for behavioral problems.      All systems reviewed and negative except for what was mentioned in the HPI    Objective   /90   Pulse 82   Temp 36.4 °C (97.6 °F)   Resp 18   Wt 90.3 kg (199 lb)   SpO2 95%   BMI 33.12 kg/m²     Physical Exam  Constitutional:       Appearance: Normal appearance. She is obese.   HENT:      Head: Normocephalic and atraumatic.      Right Ear: External ear normal.      Left Ear: External ear normal.      Nose: Nose normal.      Mouth/Throat:      Pharynx: Oropharynx is clear.   Eyes:      Extraocular Movements: Extraocular movements intact.      Conjunctiva/sclera: Conjunctivae normal.      Pupils: Pupils are equal, round, and reactive to light.   Cardiovascular:      Rate and Rhythm: Normal rate and regular rhythm.      Heart sounds: No murmur heard.  Pulmonary:      Effort: Pulmonary effort is normal. No respiratory distress.       Breath sounds: Normal breath sounds.   Abdominal:      General: Abdomen is flat. Bowel sounds are normal.      Palpations: Abdomen is soft.   Musculoskeletal:         General: Normal range of motion.      Cervical back: Normal range of motion. No rigidity or tenderness.      Right lower leg: Edema present.      Left lower leg: Edema present.   Skin:     General: Skin is warm and dry.   Neurological:      General: No focal deficit present.      Mental Status: She is alert. Mental status is at baseline.   Psychiatric:         Mood and Affect: Mood normal.         Behavior: Behavior normal.         Imaging:  No results found.     Labs reviewed:    Lab Results   Component Value Date    WBC 6.7 01/11/2023    HGB 13.2 01/11/2023    HCT 42.1 01/11/2023     01/11/2023    CHOL 201 (H) 01/11/2023    TRIG 86 01/11/2023    HDL 60.8 01/11/2023    ALT 17 01/11/2023    AST 20 01/11/2023     03/29/2023    K 4.0 03/29/2023     03/29/2023    CREATININE 1.17 (H) 03/29/2023    BUN 15 03/29/2023    CO2 24 03/29/2023    TSH 1.07 05/14/2022    INR 2.5 (A) 07/18/2023    HGBA1C 5.8 07/27/2021       Past Medical, Surgical, and Family History reviewed and updated in chart.    I have reviewed and reconciled the medication list with the patient today.   Current Outpatient Medications:     albuterol 90 mcg/actuation inhaler, Inhale 2 puffs. EVERY FOUR TO SIX HOURS AS NEEDED, Disp: , Rfl:     amLODIPine (Norvasc) 10 mg tablet, TAKE ONE TABLET BY MOUTH EVERY DAY, Disp: 90 tablet, Rfl: 3    aspirin 81 mg EC tablet, Take 1 tablet (81 mg) by mouth once daily., Disp: , Rfl:     atorvastatin (Lipitor) 10 mg tablet, Take 1 tablet (10 mg) by mouth once daily., Disp: , Rfl:     cloNIDine (Catapres) 0.2 mg tablet, TAKE ONE TABLET BY MOUTH TWICE A DAY, Disp: 180 tablet, Rfl: 3    fexofenadine (Allegra) 180 mg tablet, Take 1 tablet (180 mg) by mouth once daily., Disp: , Rfl:     fluticasone (Flonase) 50 mcg/actuation nasal spray,  Administer 1 spray into each nostril 2 times a day., Disp: , Rfl:     hydroCHLOROthiazide (HYDRODiuril) 25 mg tablet, Take 1 tablet (25 mg) by mouth once daily., Disp: 90 tablet, Rfl: 2    hydrocortisone (hydrocortisone-aloe vera) 1 % cream, Apply 1 Application topically 2 times a day., Disp: , Rfl:     omeprazole (PriLOSEC) 20 mg DR capsule, Take 1 capsule (20 mg) by mouth once daily., Disp: 90 capsule, Rfl: 3    potassium chloride CR (Klor-Con M20) 20 mEq ER tablet, Take 2 tablets (40 mEq) by mouth 2 times a day., Disp: , Rfl:     spironolactone (Aldactone) 25 mg tablet, Take 1 tablet (25 mg) by mouth once daily., Disp: 90 tablet, Rfl: 3    tiZANidine (Zanaflex) 4 mg tablet, Take 1 tablet (4 mg) by mouth 2 times a day., Disp: 30 tablet, Rfl: 1    warfarin (Coumadin) 5 mg tablet, Take 1 tablet (5 mg) by mouth once daily at bedtime. TAKE 1 1/2 TABS (7.5MG) EVERY TUES AND 1 TAB (5MG) THE REST OF THE WEEK (Patient taking differently: Take 1 tablet (5 mg) by mouth once daily at bedtime. 5mg PO daily), Disp: 90 tablet, Rfl: 3     List of current healthcare providers:  Patient Care Team:  Elma Ramos MD as PCP - General     HRA:                               Assessment/Plan :  Problem List Items Addressed This Visit          Cardiac and Vasculature    Atrial fibrillation (CMS/HCC)     On warfarin 5mg, INR 2.5         Relevant Orders    POCT INR manually resulted (Completed)    CHF (congestive heart failure) (CMS/HCC) - Primary    Hyperlipidemia     WNL per lipid panel.         Hypertension     120/90, continue medication as prescribed.             Coag and Thromboembolic    History of blood clots       Genitourinary and Reproductive    Stage III chronic kidney disease (CMS/HCC)     GFR decreased from 50 to 35 in 6 months. BUN/CR increased from 17:1.19 to 30:16.            Musculoskeletal and Injuries    Left ankle pain     Other Visit Diagnoses       Encounter for subsequent annual wellness visit (AWV) in Medicare  patient        Relevant Orders    ECG 12 lead    POCT UA (nonautomated) manually resulted (Completed)          The following health maintenance schedule was reviewed with the patient and provided in printed form in the after visit summary:  Health Maintenance   Topic Date Due    Medicare Annual Wellness Visit (AWV)  Never done    Bone Density Scan  Never done    Colorectal Cancer Screening  Never done    Diabetes: Foot Exam  Never done    Diabetes: Retinopathy Screening  Never done    Hepatitis C Screening  Never done    Diabetes: Urine Protein Screening  Never done    DTaP/Tdap/Td Vaccines (1 - Tdap) Never done    Pneumococcal Vaccine: 65+ Years (2 - PCV) 10/16/2018    Echocardiogram  02/26/2020    Diabetes: Hemoglobin A1C  10/27/2021    COVID-19 Vaccine (5 - Booster for Pfizer series) 10/04/2022    Zoster Vaccines (2 of 2) 01/28/2023    Mammogram  03/04/2023    Influenza Vaccine (1) 09/01/2023    Lipid Panel  01/11/2024    Creatinine Level  03/29/2024    Potassium Level  03/29/2024    HIB Vaccines  Aged Out    Hepatitis B Vaccines  Aged Out    IPV Vaccines  Aged Out    Hepatitis A Vaccines  Aged Out    Meningococcal Vaccine  Aged Out    Rotavirus Vaccines  Aged Out    HPV Vaccines  Aged Out       Advance Care Planning   Not received           Orders Placed This Encounter   Procedures    POCT UA (nonautomated) manually resulted    POCT INR manually resulted    ECG 12 lead       Pt here for physical exam today. Normal EKG. Reviewed lab with patient and discussed concerns about worsening kidney function. Advised to stop hydrochlorothiazide and spironolactone at this time. Will reevaluate kidney function in 4 weeks. Discussed ankle swelling. Advised the pt to elevate legs, wear compression stockings, and decrease salt intake. Per pt's request, provided cardiology consult as she has not been seen by cardiology in a couple of years. Referral placed today.   Continue current medications as listed.  Follow up in 4 weeks  to review kidney function.

## 2023-07-19 ENCOUNTER — CLINICAL SUPPORT (OUTPATIENT)
Dept: PRIMARY CARE | Facility: CLINIC | Age: 68
End: 2023-07-19
Payer: MEDICARE

## 2023-07-19 DIAGNOSIS — I48.91 ATRIAL FIBRILLATION, UNSPECIFIED TYPE (MULTI): ICD-10-CM

## 2023-07-19 RX ORDER — WARFARIN SODIUM 5 MG/1
5 TABLET ORAL NIGHTLY
Qty: 90 TABLET | Refills: 3 | Status: SHIPPED | OUTPATIENT
Start: 2023-07-19 | End: 2024-01-31 | Stop reason: SDUPTHER

## 2023-08-02 DIAGNOSIS — I26.99 PULMONARY EMBOLISM, OTHER, UNSPECIFIED CHRONICITY, UNSPECIFIED WHETHER ACUTE COR PULMONALE PRESENT (MULTI): ICD-10-CM

## 2023-08-02 DIAGNOSIS — I82.409 ACUTE EMBOLISM AND THROMBOSIS OF DEEP VEIN OF LOWER EXTREMITY, UNSPECIFIED LATERALITY (MULTI): ICD-10-CM

## 2023-08-02 DIAGNOSIS — I48.91 ATRIAL FIBRILLATION, UNSPECIFIED TYPE (MULTI): ICD-10-CM

## 2023-08-02 DIAGNOSIS — Z86.718 HISTORY OF BLOOD CLOTS: ICD-10-CM

## 2023-08-10 DIAGNOSIS — I15.9 SECONDARY HYPERTENSION: ICD-10-CM

## 2023-08-11 ENCOUNTER — APPOINTMENT (OUTPATIENT)
Dept: PRIMARY CARE | Facility: CLINIC | Age: 68
End: 2023-08-11
Payer: MEDICARE

## 2023-08-14 ENCOUNTER — APPOINTMENT (OUTPATIENT)
Dept: PRIMARY CARE | Facility: CLINIC | Age: 68
End: 2023-08-14
Payer: MEDICARE

## 2023-08-28 ENCOUNTER — HOSPITAL ENCOUNTER (OUTPATIENT)
Dept: DATA CONVERSION | Facility: HOSPITAL | Age: 68
Discharge: HOME | End: 2023-08-28
Payer: MEDICARE

## 2023-08-28 DIAGNOSIS — Z00.00 ENCOUNTER FOR GENERAL ADULT MEDICAL EXAMINATION WITHOUT ABNORMAL FINDINGS: ICD-10-CM

## 2023-08-28 DIAGNOSIS — I48.91 UNSPECIFIED ATRIAL FIBRILLATION (MULTI): ICD-10-CM

## 2023-08-28 DIAGNOSIS — I50.9 HEART FAILURE, UNSPECIFIED (MULTI): ICD-10-CM

## 2023-08-28 DIAGNOSIS — E55.9 VITAMIN D DEFICIENCY, UNSPECIFIED: ICD-10-CM

## 2023-08-28 LAB
25(OH)D3 SERPL-MCNC: 33 NG/ML (ref 31–100)
ALBUMIN SERPL-MCNC: 3.7 GM/DL (ref 3.5–5)
ALBUMIN/GLOB SERPL: 1.3 RATIO (ref 1.5–3)
ALP BLD-CCNC: 91 U/L (ref 35–125)
ALT SERPL-CCNC: 18 U/L (ref 5–40)
ANION GAP SERPL CALCULATED.3IONS-SCNC: 11 MMOL/L (ref 0–19)
APPEARANCE PLAS: CLEAR
AST SERPL-CCNC: 20 U/L (ref 5–40)
BILIRUB SERPL-MCNC: 0.5 MG/DL (ref 0.1–1.2)
BUN SERPL-MCNC: 16 MG/DL (ref 8–25)
BUN/CREAT SERPL: 16 RATIO (ref 8–21)
CALCIUM SERPL-MCNC: 9.4 MG/DL (ref 8.5–10.4)
CHLORIDE SERPL-SCNC: 108 MMOL/L (ref 97–107)
CHOLEST SERPL-MCNC: 201 MG/DL (ref 133–200)
CHOLEST/HDLC SERPL: 3.5 RATIO
CO2 SERPL-SCNC: 22 MMOL/L (ref 24–31)
COLOR SPUN FLD: YELLOW
CREAT SERPL-MCNC: 1 MG/DL (ref 0.4–1.6)
DEPRECATED RDW RBC AUTO: 46 FL (ref 37–54)
ERYTHROCYTE [DISTWIDTH] IN BLOOD BY AUTOMATED COUNT: 14.5 % (ref 11.7–15)
FASTING STATUS PATIENT QL REPORTED: ABNORMAL
GFR SERPL CREATININE-BSD FRML MDRD: 62 ML/MIN/1.73 M2
GLOBULIN SER-MCNC: 2.8 G/DL (ref 1.9–3.7)
GLUCOSE SERPL-MCNC: 77 MG/DL (ref 65–99)
HBA1C MFR BLD: 6 % (ref 4–6)
HCT VFR BLD AUTO: 40.1 % (ref 36–44)
HDLC SERPL-MCNC: 58 MG/DL
HGB BLD-MCNC: 13.3 GM/DL (ref 12–15)
LDLC SERPL CALC-MCNC: 122 MG/DL (ref 65–130)
MCH RBC QN AUTO: 28.5 PG (ref 26–34)
MCHC RBC AUTO-ENTMCNC: 33.2 % (ref 31–37)
MCV RBC AUTO: 86.1 FL (ref 80–100)
NRBC BLD-RTO: 0 /100 WBC
PLATELET # BLD AUTO: 217 K/UL (ref 150–450)
PMV BLD AUTO: 10 CU (ref 7–12.6)
POTASSIUM SERPL-SCNC: 3.8 MMOL/L (ref 3.4–5.1)
PROT SERPL-MCNC: 6.5 G/DL (ref 5.9–7.9)
RBC # BLD AUTO: 4.66 M/UL (ref 4–4.9)
SODIUM SERPL-SCNC: 141 MMOL/L (ref 133–145)
TRIGL SERPL-MCNC: 107 MG/DL (ref 40–150)
TSH SERPL DL<=0.05 MIU/L-ACNC: 0.82 MIU/L (ref 0.27–4.2)
WBC # BLD AUTO: 5.2 K/UL (ref 4.5–11)

## 2023-08-29 ENCOUNTER — OFFICE VISIT (OUTPATIENT)
Dept: PRIMARY CARE | Facility: CLINIC | Age: 68
End: 2023-08-29
Payer: MEDICARE

## 2023-08-29 VITALS
HEART RATE: 86 BPM | BODY MASS INDEX: 34.45 KG/M2 | SYSTOLIC BLOOD PRESSURE: 138 MMHG | DIASTOLIC BLOOD PRESSURE: 82 MMHG | OXYGEN SATURATION: 96 % | WEIGHT: 207 LBS | RESPIRATION RATE: 12 BRPM

## 2023-08-29 DIAGNOSIS — J20.9 ACUTE BRONCHITIS WITH BRONCHOSPASM: Primary | ICD-10-CM

## 2023-08-29 DIAGNOSIS — N17.0 ACUTE KIDNEY INJURY (AKI) WITH ACUTE TUBULAR NECROSIS (ATN) (CMS-HCC): ICD-10-CM

## 2023-08-29 DIAGNOSIS — T82.867A THROMBOSIS DUE TO CARDIAC PROSTHETIC DEVICES, IMPLANTS AND GRAFTS, INITIAL ENCOUNTER: ICD-10-CM

## 2023-08-29 DIAGNOSIS — J01.90 ACUTE SINUSITIS, RECURRENCE NOT SPECIFIED, UNSPECIFIED LOCATION: ICD-10-CM

## 2023-08-29 DIAGNOSIS — N18.32 STAGE 3B CHRONIC KIDNEY DISEASE (MULTI): ICD-10-CM

## 2023-08-29 DIAGNOSIS — I48.91 ATRIAL FIBRILLATION, UNSPECIFIED TYPE (MULTI): ICD-10-CM

## 2023-08-29 PROBLEM — R00.2 PALPITATIONS: Status: ACTIVE | Noted: 2023-08-29

## 2023-08-29 LAB
POC INR: 2.7 (ref 0.9–1.1)
POC PROTHROMBIN TIME: 32.9 (ref 9.3–12.5)

## 2023-08-29 PROCEDURE — 1159F MED LIST DOCD IN RCRD: CPT | Performed by: INTERNAL MEDICINE

## 2023-08-29 PROCEDURE — 99214 OFFICE O/P EST MOD 30 MIN: CPT | Performed by: INTERNAL MEDICINE

## 2023-08-29 PROCEDURE — 3075F SYST BP GE 130 - 139MM HG: CPT | Performed by: INTERNAL MEDICINE

## 2023-08-29 PROCEDURE — 85610 PROTHROMBIN TIME: CPT | Performed by: INTERNAL MEDICINE

## 2023-08-29 PROCEDURE — 1125F AMNT PAIN NOTED PAIN PRSNT: CPT | Performed by: INTERNAL MEDICINE

## 2023-08-29 PROCEDURE — 1036F TOBACCO NON-USER: CPT | Performed by: INTERNAL MEDICINE

## 2023-08-29 PROCEDURE — 3079F DIAST BP 80-89 MM HG: CPT | Performed by: INTERNAL MEDICINE

## 2023-08-29 RX ORDER — AMOXICILLIN AND CLAVULANATE POTASSIUM 875; 125 MG/1; MG/1
875 TABLET, FILM COATED ORAL 2 TIMES DAILY
Qty: 20 TABLET | Refills: 0 | Status: SHIPPED | OUTPATIENT
Start: 2023-08-29 | End: 2023-09-08

## 2023-08-29 RX ORDER — FLUTICASONE PROPIONATE 50 MCG
1 SPRAY, SUSPENSION (ML) NASAL DAILY
Qty: 16 G | Refills: 11 | Status: SHIPPED | OUTPATIENT
Start: 2023-08-29 | End: 2023-10-30 | Stop reason: SDUPTHER

## 2023-08-29 NOTE — PROGRESS NOTES
Subjective   Chief complaint: Halima Mckeon is a 67 y.o. female who presents for Follow-up (Pt is here for blood work follow up and INR check. 5 mg daily).    HPI:  Follow-up Coumadin therapy patient has A-fib and she is on Coumadin her INR therapeutic.  Follow-up postnasal drainage cough headache for few days feeling warm.  Follow-up hypertension  Follow-up hypokalemia  Follow-up acute kidney injury I took her off the lisinopril and her creatinine being checked today.        Objective   /82   Pulse 86   Resp 12   Wt 93.9 kg (207 lb)   SpO2 96%   BMI 34.45 kg/m²   Physical Exam  Vitals reviewed.   Constitutional:       Appearance: Normal appearance.   HENT:      Head: Normocephalic and atraumatic.      Comments: Tender sinuses  Throat is injected  Nose is injected  Cardiovascular:      Rate and Rhythm: Normal rate and regular rhythm.   Pulmonary:      Effort: Pulmonary effort is normal.      Breath sounds: Normal breath sounds.   Abdominal:      General: Bowel sounds are normal.      Palpations: Abdomen is soft.   Musculoskeletal:      Cervical back: Neck supple.   Skin:     General: Skin is warm and dry.   Neurological:      General: No focal deficit present.      Mental Status: She is alert.   Psychiatric:         Mood and Affect: Mood normal.         Behavior: Behavior is cooperative.         I have reviewed and reconciled the medication list with the patient today.   Current Outpatient Medications:     albuterol 90 mcg/actuation inhaler, Inhale 2 puffs. EVERY FOUR TO SIX HOURS AS NEEDED, Disp: , Rfl:     amLODIPine (Norvasc) 10 mg tablet, TAKE ONE TABLET BY MOUTH EVERY DAY, Disp: 90 tablet, Rfl: 3    aspirin 81 mg EC tablet, Take 1 tablet (81 mg) by mouth once daily., Disp: , Rfl:     atorvastatin (Lipitor) 10 mg tablet, Take 1 tablet (10 mg) by mouth once daily., Disp: , Rfl:     cloNIDine (Catapres) 0.2 mg tablet, TAKE ONE TABLET BY MOUTH TWICE A DAY, Disp: 180 tablet, Rfl: 3    fexofenadine  (Allegra) 180 mg tablet, Take 1 tablet (180 mg) by mouth once daily., Disp: , Rfl:     fluticasone (Flonase) 50 mcg/actuation nasal spray, Administer 1 spray into each nostril 2 times a day., Disp: , Rfl:     hydroCHLOROthiazide (HYDRODiuril) 25 mg tablet, Take 1 tablet (25 mg) by mouth once daily., Disp: 90 tablet, Rfl: 2    hydrocortisone (hydrocortisone-aloe vera) 1 % cream, Apply 1 Application topically 2 times a day., Disp: , Rfl:     omeprazole (PriLOSEC) 20 mg DR capsule, Take 1 capsule (20 mg) by mouth once daily., Disp: 90 capsule, Rfl: 3    potassium chloride CR (Klor-Con M20) 20 mEq ER tablet, Take 2 tablets (40 mEq) by mouth 2 times a day., Disp: , Rfl:     spironolactone (Aldactone) 25 mg tablet, Take 1 tablet (25 mg) by mouth once daily., Disp: 90 tablet, Rfl: 3    tiZANidine (Zanaflex) 4 mg tablet, Take 1 tablet (4 mg) by mouth 2 times a day., Disp: 30 tablet, Rfl: 1    warfarin (Coumadin) 5 mg tablet, Take 1 tablet (5 mg) by mouth once daily at bedtime. 5mg PO daily, Disp: 90 tablet, Rfl: 3     Imaging:  No results found.     Labs reviewed:    Lab Results   Component Value Date    WBC 6.7 01/11/2023    HGB 13.2 01/11/2023    HCT 42.1 01/11/2023     01/11/2023    CHOL 201 (H) 01/11/2023    TRIG 86 01/11/2023    HDL 60.8 01/11/2023    ALT 17 01/11/2023    AST 20 01/11/2023     03/29/2023    K 4.0 03/29/2023     03/29/2023    CREATININE 1.17 (H) 03/29/2023    BUN 15 03/29/2023    CO2 24 03/29/2023    TSH 1.07 05/14/2022    INR 2.7 (A) 08/29/2023    HGBA1C 5.8 07/27/2021       Assessment/Plan   Problem List Items Addressed This Visit       Acute bronchitis with bronchospasm - Primary    Acute sinusitis     Augmentin  Mucinex  Flonase         Atrial fibrillation (CMS/HCC)     On warfarin 5mg, INR 2.8Therapeutic continue same treat  Check INR in a week because she is on antibiotic.         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

## 2023-08-29 NOTE — ASSESSMENT & PLAN NOTE
On warfarin 5mg, INR 2.8Therapeutic continue same treat  Check INR in a week because she is on antibiotic.

## 2023-10-10 ENCOUNTER — OFFICE VISIT (OUTPATIENT)
Dept: PRIMARY CARE | Facility: CLINIC | Age: 68
End: 2023-10-10
Payer: MEDICARE

## 2023-10-10 VITALS
SYSTOLIC BLOOD PRESSURE: 112 MMHG | DIASTOLIC BLOOD PRESSURE: 76 MMHG | RESPIRATION RATE: 12 BRPM | BODY MASS INDEX: 33.78 KG/M2 | WEIGHT: 203 LBS | OXYGEN SATURATION: 96 % | HEART RATE: 75 BPM

## 2023-10-10 DIAGNOSIS — D68.9 COAGULOPATHY (MULTI): Primary | ICD-10-CM

## 2023-10-10 DIAGNOSIS — I48.91 ATRIAL FIBRILLATION, UNSPECIFIED TYPE (MULTI): ICD-10-CM

## 2023-10-10 DIAGNOSIS — M17.0 PRIMARY OSTEOARTHRITIS OF BOTH KNEES: ICD-10-CM

## 2023-10-10 DIAGNOSIS — E53.8 VITAMIN B12 DEFICIENCY: ICD-10-CM

## 2023-10-10 DIAGNOSIS — J01.90 ACUTE SINUSITIS, RECURRENCE NOT SPECIFIED, UNSPECIFIED LOCATION: ICD-10-CM

## 2023-10-10 LAB
POC INR: 2.5 (ref 0.9–1.1)
POC PROTHROMBIN TIME: 29.7 (ref 9.3–12.5)

## 2023-10-10 PROCEDURE — 85610 PROTHROMBIN TIME: CPT | Performed by: INTERNAL MEDICINE

## 2023-10-10 PROCEDURE — 3074F SYST BP LT 130 MM HG: CPT | Performed by: INTERNAL MEDICINE

## 2023-10-10 PROCEDURE — 3078F DIAST BP <80 MM HG: CPT | Performed by: INTERNAL MEDICINE

## 2023-10-10 PROCEDURE — 99213 OFFICE O/P EST LOW 20 MIN: CPT | Performed by: INTERNAL MEDICINE

## 2023-10-10 PROCEDURE — 3044F HG A1C LEVEL LT 7.0%: CPT | Performed by: INTERNAL MEDICINE

## 2023-10-10 PROCEDURE — 1125F AMNT PAIN NOTED PAIN PRSNT: CPT | Performed by: INTERNAL MEDICINE

## 2023-10-10 PROCEDURE — 1159F MED LIST DOCD IN RCRD: CPT | Performed by: INTERNAL MEDICINE

## 2023-10-10 PROCEDURE — 1036F TOBACCO NON-USER: CPT | Performed by: INTERNAL MEDICINE

## 2023-10-10 PROCEDURE — 96372 THER/PROPH/DIAG INJ SC/IM: CPT | Performed by: INTERNAL MEDICINE

## 2023-10-10 RX ORDER — METHYLPREDNISOLONE ACETATE 80 MG/ML
80 INJECTION, SUSPENSION INTRA-ARTICULAR; INTRALESIONAL; INTRAMUSCULAR; SOFT TISSUE ONCE
Status: COMPLETED | OUTPATIENT
Start: 2023-10-10 | End: 2023-10-10

## 2023-10-10 RX ORDER — CYANOCOBALAMIN 1000 UG/ML
1000 INJECTION, SOLUTION INTRAMUSCULAR; SUBCUTANEOUS ONCE
Status: COMPLETED | OUTPATIENT
Start: 2023-10-10 | End: 2023-10-10

## 2023-10-10 RX ADMIN — METHYLPREDNISOLONE ACETATE 80 MG: 80 INJECTION, SUSPENSION INTRA-ARTICULAR; INTRALESIONAL; INTRAMUSCULAR; SOFT TISSUE at 14:36

## 2023-10-10 RX ADMIN — CYANOCOBALAMIN 1000 MCG: 1000 INJECTION, SOLUTION INTRAMUSCULAR; SUBCUTANEOUS at 14:35

## 2023-10-10 NOTE — PROGRESS NOTES
Subjective   Chief complaint: Halima Mckeon is a 67 y.o. female who presents for Subtherapeutic INR (Pt is here for inr check takes 5 mg every day , b12 and allergy shot injection ).    HPI:  Pt is here for INR check. INR is 2.5 today. Takes 5mg warfarin daily.     Pt is also here for B12 and Depo-Medrol shot.        Objective   /76   Pulse 75   Resp 12   Wt 92.1 kg (203 lb)   SpO2 96%   BMI 33.78 kg/m²   Physical Exam  Vitals reviewed.   Constitutional:       Appearance: Normal appearance.   HENT:      Head: Normocephalic and atraumatic.   Cardiovascular:      Rate and Rhythm: Normal rate and regular rhythm.      Pulses: Normal pulses.      Heart sounds: Normal heart sounds.   Pulmonary:      Effort: Pulmonary effort is normal.      Breath sounds: Normal breath sounds.   Neurological:      Mental Status: She is alert and oriented to person, place, and time.   Psychiatric:         Mood and Affect: Mood normal.         I have reviewed and reconciled the medication list with the patient today.   Current Outpatient Medications:     albuterol 90 mcg/actuation inhaler, Inhale 2 puffs. EVERY FOUR TO SIX HOURS AS NEEDED, Disp: , Rfl:     amLODIPine (Norvasc) 10 mg tablet, TAKE ONE TABLET BY MOUTH EVERY DAY, Disp: 90 tablet, Rfl: 3    aspirin 81 mg EC tablet, Take 1 tablet (81 mg) by mouth once daily., Disp: , Rfl:     atorvastatin (Lipitor) 10 mg tablet, Take 1 tablet (10 mg) by mouth once daily., Disp: , Rfl:     cloNIDine (Catapres) 0.2 mg tablet, TAKE ONE TABLET BY MOUTH TWICE A DAY, Disp: 180 tablet, Rfl: 3    fexofenadine (Allegra) 180 mg tablet, Take 1 tablet (180 mg) by mouth once daily., Disp: , Rfl:     fluticasone (Flonase) 50 mcg/actuation nasal spray, Administer 1 spray into each nostril once daily. Shake gently. Before first use, prime pump. After use, clean tip and replace cap., Disp: 16 g, Rfl: 11    hydrocortisone (hydrocortisone-aloe vera) 1 % cream, Apply 1 Application topically 2 times a day.,  Disp: , Rfl:     omeprazole (PriLOSEC) 20 mg DR capsule, Take 1 capsule (20 mg) by mouth once daily., Disp: 90 capsule, Rfl: 3    potassium chloride CR (Klor-Con M20) 20 mEq ER tablet, Take 2 tablets (40 mEq) by mouth 2 times a day., Disp: , Rfl:     tiZANidine (Zanaflex) 4 mg tablet, Take 1 tablet (4 mg) by mouth 2 times a day., Disp: 30 tablet, Rfl: 1    warfarin (Coumadin) 5 mg tablet, Take 1 tablet (5 mg) by mouth once daily at bedtime. 5mg PO daily, Disp: 90 tablet, Rfl: 3    hydroCHLOROthiazide (HYDRODiuril) 25 mg tablet, Take 1 tablet (25 mg) by mouth once daily. (Patient not taking: Reported on 10/10/2023), Disp: 90 tablet, Rfl: 2    spironolactone (Aldactone) 25 mg tablet, Take 1 tablet (25 mg) by mouth once daily. (Patient not taking: Reported on 10/10/2023), Disp: 90 tablet, Rfl: 3     Imaging:  No results found.     Labs reviewed:    Lab Results   Component Value Date    WBC 5.2 08/28/2023    HGB 13.3 08/28/2023    HCT 40.1 08/28/2023     08/28/2023    CHOL 201 (H) 08/28/2023    TRIG 107 08/28/2023    HDL 58 08/28/2023    ALT 18 08/28/2023    AST 20 08/28/2023     08/28/2023    K 3.8 08/28/2023     (H) 08/28/2023    CREATININE 1.0 08/28/2023    BUN 16 08/28/2023    CO2 22 (L) 08/28/2023    TSH 0.82 08/28/2023    INR 2.5 (A) 10/10/2023    HGBA1C 6.0 08/28/2023       Assessment/Plan   Problem List Items Addressed This Visit       Atrial fibrillation (CMS/HCC)    Relevant Orders    POCT INR manually resulted (Completed)    Coagulopathy (CMS/HCC) - Primary     INR is within therapeutic range today. Continue with 5mg coumadin daily. Recheck INR in 1 mo.          Osteoarthritis of knee     Received Depo-Medrol shot         Vitamin B12 deficiency     Received B12 shot today.            Continue current medications as listed  Follow up in 1 mo for INR check.

## 2023-10-24 ENCOUNTER — APPOINTMENT (OUTPATIENT)
Dept: PRIMARY CARE | Facility: CLINIC | Age: 68
End: 2023-10-24
Payer: MEDICARE

## 2023-10-24 ENCOUNTER — APPOINTMENT (OUTPATIENT)
Dept: RADIOLOGY | Facility: HOSPITAL | Age: 68
End: 2023-10-24
Payer: MEDICARE

## 2023-10-24 ENCOUNTER — HOSPITAL ENCOUNTER (EMERGENCY)
Facility: HOSPITAL | Age: 68
Discharge: HOME | End: 2023-10-24
Attending: EMERGENCY MEDICINE
Payer: MEDICARE

## 2023-10-24 VITALS
DIASTOLIC BLOOD PRESSURE: 83 MMHG | BODY MASS INDEX: 33.49 KG/M2 | HEIGHT: 65 IN | HEART RATE: 65 BPM | WEIGHT: 201 LBS | SYSTOLIC BLOOD PRESSURE: 140 MMHG | RESPIRATION RATE: 16 BRPM | TEMPERATURE: 98.5 F | OXYGEN SATURATION: 95 %

## 2023-10-24 DIAGNOSIS — J20.9 ACUTE BRONCHITIS, UNSPECIFIED ORGANISM: ICD-10-CM

## 2023-10-24 LAB
ALBUMIN SERPL BCP-MCNC: 3.7 G/DL (ref 3.4–5)
ALP SERPL-CCNC: 82 U/L (ref 33–136)
ALT SERPL W P-5'-P-CCNC: 19 U/L (ref 7–45)
ANION GAP SERPL CALC-SCNC: 13 MMOL/L (ref 10–20)
APTT PPP: 29 SECONDS (ref 27–38)
AST SERPL W P-5'-P-CCNC: 29 U/L (ref 9–39)
BASOPHILS # BLD AUTO: 0.03 X10*3/UL (ref 0–0.1)
BASOPHILS NFR BLD AUTO: 0.5 %
BILIRUB SERPL-MCNC: 0.7 MG/DL (ref 0–1.2)
BNP SERPL-MCNC: 135 PG/ML (ref 0–99)
BUN SERPL-MCNC: 14 MG/DL (ref 6–23)
CALCIUM SERPL-MCNC: 8.9 MG/DL (ref 8.6–10.3)
CARDIAC TROPONIN I PNL SERPL HS: 16 NG/L (ref 0–13)
CARDIAC TROPONIN I PNL SERPL HS: 16 NG/L (ref 0–13)
CHLORIDE SERPL-SCNC: 106 MMOL/L (ref 98–107)
CO2 SERPL-SCNC: 25 MMOL/L (ref 21–32)
CREAT SERPL-MCNC: 0.92 MG/DL (ref 0.5–1.05)
EOSINOPHIL # BLD AUTO: 0.16 X10*3/UL (ref 0–0.7)
EOSINOPHIL NFR BLD AUTO: 2.9 %
ERYTHROCYTE [DISTWIDTH] IN BLOOD BY AUTOMATED COUNT: 14.4 % (ref 11.5–14.5)
FLUAV RNA RESP QL NAA+PROBE: NOT DETECTED
FLUBV RNA RESP QL NAA+PROBE: NOT DETECTED
GFR SERPL CREATININE-BSD FRML MDRD: 69 ML/MIN/1.73M*2
GLUCOSE SERPL-MCNC: 77 MG/DL (ref 74–99)
HCT VFR BLD AUTO: 39.9 % (ref 36–46)
HGB BLD-MCNC: 13 G/DL (ref 12–16)
IMM GRANULOCYTES # BLD AUTO: 0.01 X10*3/UL (ref 0–0.7)
IMM GRANULOCYTES NFR BLD AUTO: 0.2 % (ref 0–0.9)
INR PPP: 2.7 (ref 0.9–1.1)
LYMPHOCYTES # BLD AUTO: 1.48 X10*3/UL (ref 1.2–4.8)
LYMPHOCYTES NFR BLD AUTO: 26.9 %
MCH RBC QN AUTO: 28.1 PG (ref 26–34)
MCHC RBC AUTO-ENTMCNC: 32.6 G/DL (ref 32–36)
MCV RBC AUTO: 86 FL (ref 80–100)
MONOCYTES # BLD AUTO: 0.86 X10*3/UL (ref 0.1–1)
MONOCYTES NFR BLD AUTO: 15.6 %
NEUTROPHILS # BLD AUTO: 2.97 X10*3/UL (ref 1.2–7.7)
NEUTROPHILS NFR BLD AUTO: 53.9 %
NRBC BLD-RTO: 0 /100 WBCS (ref 0–0)
PLATELET # BLD AUTO: 204 X10*3/UL (ref 150–450)
PMV BLD AUTO: 10.3 FL (ref 7.5–11.5)
POTASSIUM SERPL-SCNC: 4 MMOL/L (ref 3.5–5.3)
PROT SERPL-MCNC: 6.2 G/DL (ref 6.4–8.2)
PROTHROMBIN TIME: 31 SECONDS (ref 9.8–12.8)
RBC # BLD AUTO: 4.63 X10*6/UL (ref 4–5.2)
SARS-COV-2 RNA RESP QL NAA+PROBE: NOT DETECTED
SODIUM SERPL-SCNC: 140 MMOL/L (ref 136–145)
WBC # BLD AUTO: 5.5 X10*3/UL (ref 4.4–11.3)

## 2023-10-24 PROCEDURE — 71046 X-RAY EXAM CHEST 2 VIEWS: CPT | Mod: FY

## 2023-10-24 PROCEDURE — 87636 SARSCOV2 & INF A&B AMP PRB: CPT

## 2023-10-24 PROCEDURE — 85730 THROMBOPLASTIN TIME PARTIAL: CPT

## 2023-10-24 PROCEDURE — 36415 COLL VENOUS BLD VENIPUNCTURE: CPT

## 2023-10-24 PROCEDURE — 99285 EMERGENCY DEPT VISIT HI MDM: CPT | Mod: 25

## 2023-10-24 PROCEDURE — 99284 EMERGENCY DEPT VISIT MOD MDM: CPT | Performed by: EMERGENCY MEDICINE

## 2023-10-24 PROCEDURE — 71046 X-RAY EXAM CHEST 2 VIEWS: CPT | Performed by: RADIOLOGY

## 2023-10-24 PROCEDURE — 2550000001 HC RX 255 CONTRASTS: Performed by: EMERGENCY MEDICINE

## 2023-10-24 PROCEDURE — 83880 ASSAY OF NATRIURETIC PEPTIDE: CPT

## 2023-10-24 PROCEDURE — 85025 COMPLETE CBC W/AUTO DIFF WBC: CPT

## 2023-10-24 PROCEDURE — 84484 ASSAY OF TROPONIN QUANT: CPT | Mod: 59

## 2023-10-24 PROCEDURE — 80053 COMPREHEN METABOLIC PANEL: CPT

## 2023-10-24 PROCEDURE — 71275 CT ANGIOGRAPHY CHEST: CPT | Mod: MG

## 2023-10-24 PROCEDURE — 71275 CT ANGIOGRAPHY CHEST: CPT | Performed by: RADIOLOGY

## 2023-10-24 PROCEDURE — 85610 PROTHROMBIN TIME: CPT

## 2023-10-24 PROCEDURE — 84484 ASSAY OF TROPONIN QUANT: CPT

## 2023-10-24 RX ORDER — DOXYCYCLINE 100 MG/1
100 TABLET ORAL 2 TIMES DAILY
Qty: 10 TABLET | Refills: 0 | Status: SHIPPED | OUTPATIENT
Start: 2023-10-24 | End: 2023-10-30 | Stop reason: ALTCHOICE

## 2023-10-24 RX ORDER — AMOXICILLIN AND CLAVULANATE POTASSIUM 875; 125 MG/1; MG/1
875 TABLET, FILM COATED ORAL 2 TIMES DAILY
Qty: 10 TABLET | Refills: 0 | Status: SHIPPED | OUTPATIENT
Start: 2023-10-24 | End: 2023-10-30 | Stop reason: ALTCHOICE

## 2023-10-24 RX ADMIN — IOHEXOL 75 ML: 350 INJECTION, SOLUTION INTRAVENOUS at 14:21

## 2023-10-24 ASSESSMENT — COLUMBIA-SUICIDE SEVERITY RATING SCALE - C-SSRS
1. IN THE PAST MONTH, HAVE YOU WISHED YOU WERE DEAD OR WISHED YOU COULD GO TO SLEEP AND NOT WAKE UP?: NO
6. HAVE YOU EVER DONE ANYTHING, STARTED TO DO ANYTHING, OR PREPARED TO DO ANYTHING TO END YOUR LIFE?: NO
2. HAVE YOU ACTUALLY HAD ANY THOUGHTS OF KILLING YOURSELF?: NO

## 2023-10-24 ASSESSMENT — PAIN - FUNCTIONAL ASSESSMENT: PAIN_FUNCTIONAL_ASSESSMENT: 0-10

## 2023-10-24 ASSESSMENT — PAIN SCALES - GENERAL: PAINLEVEL_OUTOF10: 0 - NO PAIN

## 2023-10-24 NOTE — PROGRESS NOTES
Pharmacy Medication History Review    Halima Mckeon is a 67 y.o. female admitted for No Principal Problem: There is no principal problem currently on the Problem List. Please update the Problem List and refresh.. Pharmacy reviewed the patient's [id number] medications and allergies for accuracy.    The list below reflectives the updated PTA list. Please review each medication in order reconciliation for additional clarification and justification.  Prior to Admission Medications   Prescriptions Last Dose Informant Patient Reported? Taking?   albuterol 90 mcg/actuation inhaler   Yes No   Sig: Inhale 2 puffs. EVERY FOUR TO SIX HOURS AS NEEDED   amLODIPine (Norvasc) 10 mg tablet 10/23/2023  No No   Sig: TAKE ONE TABLET BY MOUTH EVERY DAY   aspirin 81 mg EC tablet 10/23/2023  Yes No   Sig: Take 1 tablet (81 mg) by mouth once daily.   atorvastatin (Lipitor) 10 mg tablet 10/23/2023  Yes No   Sig: Take 1 tablet (10 mg) by mouth once daily.   cloNIDine (Catapres) 0.2 mg tablet 10/23/2023  No No   Sig: TAKE ONE TABLET BY MOUTH TWICE A DAY   fexofenadine (Allegra) 180 mg tablet   Yes No   Sig: Take 1 tablet (180 mg) by mouth once daily.   fluticasone (Flonase) 50 mcg/actuation nasal spray   No No   Sig: Administer 1 spray into each nostril once daily. Shake gently. Before first use, prime pump. After use, clean tip and replace cap.   hydroCHLOROthiazide (HYDRODiuril) 25 mg tablet   No No   Sig: Take 1 tablet (25 mg) by mouth once daily.   hydrocortisone (hydrocortisone-aloe vera) 1 % cream   Yes No   Sig: Apply 1 Application topically 2 times a day.   omeprazole (PriLOSEC) 20 mg DR capsule 10/23/2023  No No   Sig: Take 1 capsule (20 mg) by mouth once daily.   potassium chloride CR (Klor-Con M20) 20 mEq ER tablet 10/23/2023  Yes No   Sig: Take 2 tablets (40 mEq) by mouth 2 times a day.   spironolactone (Aldactone) 25 mg tablet   No No   Sig: Take 1 tablet (25 mg) by mouth once daily.   tiZANidine (Zanaflex) 4 mg tablet  10/23/2023  No No   Sig: Take 1 tablet (4 mg) by mouth 2 times a day.   warfarin (Coumadin) 5 mg tablet 10/23/2023  No No   Sig: Take 1 tablet (5 mg) by mouth once daily at bedtime. 5mg PO daily      Facility-Administered Medications: None           The list below reflectives the updated allergy list. Please review each documented allergy for additional clarification and justification.  Allergies  Reviewed by Mary Lewis RN on 10/24/2023        Severity Reactions Comments    Ace Inhibitors Not Specified Other Cough            Below are additional concerns with the patient's PTA list.  Patient verified all medications and doses    Yaquelin Gifford CPhT

## 2023-10-24 NOTE — ED PROVIDER NOTES
HPI   Chief Complaint   Patient presents with    Cough     PT TO ED WITH C/O PRODUCTIVE COUGH FOR ABOUT 2 WEEKS. STATES COUGHING UP CLEAR PHLEGM. DENIES CP, N/V, FEVER, CHILLS. ALSO ENDORSES SLIGHT H/A THIS AM. HX PNEUMONIA        HISTORY OF PRESENT ILLNESS:  67 y.o. female presenting to the ED with complaint of a productive cough for the past 2 to 3 weeks.  She reports a wet cough that has been productive of clear phlegm.  She states that she feels her chest congestion as well.  She denies hemoptysis.  States that she completed a course of amoxicillin 3 weeks ago when her symptoms began, which seemed to initially improve her symptoms, but they have subsequently returned.  Has a history of pneumonia and is concerned that she may have pneumonia again.  She states that she occasionally has chest pressure when coughing, but states it is very mild, no persistent chest pressure or pain.  No exertional chest pain or discomfort.  She denies shortness of breath.  No palpitations.  She denies any fevers or chills.  She also endorses clear rhinorrhea.  Denies nasal congestion.  No abdominal pain, nausea, or vomiting.  No dizziness or lightheadedness, no headaches, no neck or back pain.  No lower extremity edema.  No abdominal pain.  No other complaints.     PMH: reviewed  Family history: noncontributory  Social history: non smoker, no ETOH, no illicit substances                           No data recorded                Patient History   Past Medical History:   Diagnosis Date    Encounter for general adult medical examination without abnormal findings 10/11/2018    Encounter for annual health examination    Encounter for general adult medical examination without abnormal findings     Encounter for annual health examination    Encounter for immunization 10/15/2018    Need for shingles vaccine    Personal history of other diseases of the circulatory system 05/25/2014    History of hypertension    Personal history of other  diseases of the circulatory system 2018    History of atrial fibrillation    Personal history of other diseases of the circulatory system 10/23/2015    History of atrial fibrillation    Personal history of other specified conditions 2013    History of heartburn    Unspecified tear of unspecified meniscus, current injury, unspecified knee, initial encounter 2013    Acute meniscal tear of knee     Past Surgical History:   Procedure Laterality Date    HERNIA REPAIR  2014    Hernia Repair    KNEE SURGERY  2014    Knee Surgery    OTHER SURGICAL HISTORY  2019    Inguinal hernia repair    OTHER SURGICAL HISTORY  2019     section    OTHER SURGICAL HISTORY  2019    Cervical cerclage placement    OTHER SURGICAL HISTORY  2019    Knee replacement     Family History   Problem Relation Name Age of Onset    Other (CARDIAC DISORDER) Father      Hypertension Father      Cancer Father      Hypertension Brother      Cancer Brother      Prostate cancer Brother      Other (CERVICAL DYSPLAGIA) Daughter      Diabetes Daughter      Lung cancer Father's Brother      Lung cancer Maternal Grandmother      Hypertension Other AUNT     Hypertension Cousin      Lung cancer Cousin      Prostate cancer Cousin       Social History     Tobacco Use    Smoking status: Never    Smokeless tobacco: Never   Substance Use Topics    Alcohol use: Never    Drug use: Never       Physical Exam   ED Triage Vitals [10/24/23 0840]   Temp Heart Rate Resp BP   36.9 °C (98.5 °F) 68 18 (!) 155/93      SpO2 Temp src Heart Rate Source Patient Position   98 % -- Monitor --      BP Location FiO2 (%)     -- --       Physical Exam  Constitutional:       General: She is not in acute distress.     Appearance: She is not toxic-appearing.   HENT:      Head: Normocephalic and atraumatic.      Nose: No congestion.      Mouth/Throat:      Mouth: Mucous membranes are moist.   Eyes:      General: No scleral icterus.      Extraocular Movements: Extraocular movements intact.      Pupils: Pupils are equal, round, and reactive to light.   Cardiovascular:      Rate and Rhythm: Normal rate and regular rhythm.      Pulses: Normal pulses.   Pulmonary:      Effort: Pulmonary effort is normal. No respiratory distress.      Breath sounds: No wheezing, rhonchi or rales.   Chest:      Chest wall: No tenderness.   Abdominal:      General: There is no distension.      Palpations: Abdomen is soft.      Tenderness: There is no abdominal tenderness. There is no guarding.   Musculoskeletal:         General: Normal range of motion.      Cervical back: Normal range of motion and neck supple. No rigidity.      Right lower leg: No edema.      Left lower leg: No edema.   Skin:     General: Skin is warm and dry.      Capillary Refill: Capillary refill takes less than 2 seconds.   Neurological:      General: No focal deficit present.      Mental Status: She is alert and oriented to person, place, and time.      Gait: Gait normal.   Psychiatric:         Mood and Affect: Mood normal.         Behavior: Behavior normal.         Judgment: Judgment normal.       ED Course & MDM   Diagnoses as of 10/24/23 1908   Acute bronchitis, unspecified organism       Medical Decision Making  10:39 12 lead EKG interpreted by myself and my ED attending reveals sinus rhythm with a rate of 57 beats per minute.  Normal Axis.  There are no ST elevations or depressions. Nonspecific T wave abnormalities present. No acute ischemic changes identified.     ED course / MDM     Summary:  Patient presented with a productive cough for the past 2 to 3 weeks, reports mild chest discomfort with coughing.  No overt shortness of breath.  Vital signs stable, patient appears nontoxic.  Ambulating unassisted.  No acute distress.  No tachypnea.  Speaking in full sentence without difficulty.  No peripheral edema.  Abdomen soft and nontender.  IV established, labs drawn.  Cardiac work-up initiated.   COVID and flu swabs are negative.  No significant electrode abnormalities, normal kidney and liver function.  Troponin initially mildly elevated at 16, repeat troponin also 16.  Minimally elevated BNP of 135.  EKG nonischemic.  She has no leukocytosis and a normal hemoglobin.  Chest x-ray shows no acute process, no focal infiltrate or pneumothorax.  CT PE is negative for PE, shows tree-in-bud opacities which can be seen with an infectious process as well as likely bronchitis. Patient case discussed with ED attending Dr. Murray, who also saw and evaluated the patient. Results and differential were discussed in detail with the patient.  She uses albuterol nebulizers at home, will continue with this.  We will cover with doxycycline and azithromycin.  She feels well and would like to be discharged. Patient was given strict return precautions, understands reasons to return to the ED. Also discussed supportive care instructions. I expressed the importance of outpatient follow up with their PCP. All questions were answered, patient expressed understanding and stated that they would comply.    Patient was advised to follow up with PCP or recommended provider in 2-3 days for another evaluation and exam. I advised patient and family/friend/caregiver/guardian to return or go to closest emergency room immediately if symptoms change, get worse, new symptoms develop prior to follow up. If there is no improvement in symptoms in the next 24 hours they are advised to return for further evaluation and exam. I also explained the plan and treatment course. Patient and family/friend/caregiver/guardian is in agreement with plan, treatment course, and follow up and states verbally that they will comply.    Impression:  1. See diagnosis    Plan: Homegoing. I discussed the differential, results, and discharge plan with the patient and family/friend/caregiver. I emphasized the importance of follow-up with the physician I referred them  to in the timeframe recommended.  I explained reasons for the patient to return to the Emergency Department. They agreed that if they feel their condition is worsening or if they have any other concern they should call 911 immediately for further assistance. We also discussed medications that were prescribed including common side effects and interactions. The patient was advised to abstain from driving, operating heavy machinery, or making significant decisions while taking medications such as opiates and muscle relaxers that may impair this. I gave the patient an opportunity to ask all questions they had and answered all of them accordingly. They understand return precautions and discharge instructions. The patient and family/friend/caregiver expressed understanding verbally and that they would comply.       Disposition: Discharge    Patient seen and discussed with Dr. Murray    This note has been transcribed using voice recognition and may contain grammatical errors, misplaced words, incorrect words, incorrect phrases or other errors.         Procedure  Procedures     Josephine Batista PA-C  10/24/23 1913       Jose Antonio Murray MD  10/26/23 1206

## 2023-10-30 ENCOUNTER — OFFICE VISIT (OUTPATIENT)
Dept: PRIMARY CARE | Facility: CLINIC | Age: 68
End: 2023-10-30
Payer: MEDICARE

## 2023-10-30 VITALS
WEIGHT: 200 LBS | OXYGEN SATURATION: 95 % | RESPIRATION RATE: 12 BRPM | BODY MASS INDEX: 33.28 KG/M2 | SYSTOLIC BLOOD PRESSURE: 130 MMHG | DIASTOLIC BLOOD PRESSURE: 74 MMHG | HEART RATE: 77 BPM

## 2023-10-30 DIAGNOSIS — J01.90 ACUTE SINUSITIS, RECURRENCE NOT SPECIFIED, UNSPECIFIED LOCATION: ICD-10-CM

## 2023-10-30 DIAGNOSIS — J20.9 ACUTE BRONCHITIS WITH BRONCHOSPASM: ICD-10-CM

## 2023-10-30 DIAGNOSIS — I15.9 SECONDARY HYPERTENSION: ICD-10-CM

## 2023-10-30 DIAGNOSIS — I48.91 ATRIAL FIBRILLATION, UNSPECIFIED TYPE (MULTI): ICD-10-CM

## 2023-10-30 DIAGNOSIS — D68.9 COAGULOPATHY (MULTI): Primary | ICD-10-CM

## 2023-10-30 DIAGNOSIS — M54.50 LOW BACK PAIN, UNSPECIFIED BACK PAIN LATERALITY, UNSPECIFIED CHRONICITY, UNSPECIFIED WHETHER SCIATICA PRESENT: ICD-10-CM

## 2023-10-30 DIAGNOSIS — I82.409 ACUTE EMBOLISM AND THROMBOSIS OF DEEP VEIN OF LOWER EXTREMITY, UNSPECIFIED LATERALITY (MULTI): ICD-10-CM

## 2023-10-30 PROBLEM — M79.10 MYALGIA: Status: ACTIVE | Noted: 2023-10-30

## 2023-10-30 LAB
POC INR: 3.5 (ref 0.9–1.1)
POC PROTHROMBIN TIME: 42.6 (ref 9.3–12.5)

## 2023-10-30 PROCEDURE — 3075F SYST BP GE 130 - 139MM HG: CPT | Performed by: INTERNAL MEDICINE

## 2023-10-30 PROCEDURE — 3078F DIAST BP <80 MM HG: CPT | Performed by: INTERNAL MEDICINE

## 2023-10-30 PROCEDURE — 1159F MED LIST DOCD IN RCRD: CPT | Performed by: INTERNAL MEDICINE

## 2023-10-30 PROCEDURE — 99214 OFFICE O/P EST MOD 30 MIN: CPT | Performed by: INTERNAL MEDICINE

## 2023-10-30 PROCEDURE — 1125F AMNT PAIN NOTED PAIN PRSNT: CPT | Performed by: INTERNAL MEDICINE

## 2023-10-30 PROCEDURE — 3044F HG A1C LEVEL LT 7.0%: CPT | Performed by: INTERNAL MEDICINE

## 2023-10-30 PROCEDURE — 85610 PROTHROMBIN TIME: CPT | Performed by: INTERNAL MEDICINE

## 2023-10-30 PROCEDURE — 1036F TOBACCO NON-USER: CPT | Performed by: INTERNAL MEDICINE

## 2023-10-30 RX ORDER — AMLODIPINE BESYLATE 10 MG/1
10 TABLET ORAL DAILY
Qty: 90 TABLET | Refills: 3 | Status: SHIPPED | OUTPATIENT
Start: 2023-10-30 | End: 2024-04-22 | Stop reason: SDUPTHER

## 2023-10-30 RX ORDER — FLUTICASONE PROPIONATE 50 MCG
1 SPRAY, SUSPENSION (ML) NASAL DAILY
Qty: 16 G | Refills: 11 | Status: SHIPPED | OUTPATIENT
Start: 2023-10-30 | End: 2024-10-29

## 2023-10-30 RX ORDER — TIZANIDINE 4 MG/1
4 TABLET ORAL 2 TIMES DAILY
Qty: 30 TABLET | Refills: 1 | Status: SHIPPED | OUTPATIENT
Start: 2023-10-30 | End: 2024-01-22 | Stop reason: SDUPTHER

## 2023-10-30 RX ORDER — ALBUTEROL SULFATE 90 UG/1
2 AEROSOL, METERED RESPIRATORY (INHALATION)
Qty: 18 G | Refills: 1 | Status: SHIPPED | OUTPATIENT
Start: 2023-10-30

## 2023-10-30 NOTE — PROGRESS NOTES
Subjective   Chief complaint: Halima Mckeon is a 68 y.o. female who presents for Follow-up (Pt is here for Ed follow up for bronchitis. INR  check up ).    HPI:  68 year old female with Afib, HTN, hyperlipidemia presents today for follow up of bronchitis and INR check up. States she still has bronchitis symptoms. Was seen at [facility] where patient states nothing was done for her. Patient was given antibiotics and informed to use albuterol at home. Albuterol helped somewhat with symptoms. Denies fever, vomiting, hemoptysis. Cough productive of white phlegm. States it has gotten somewhat better. INR is 3.5 today.        Objective   /74   Pulse 77   Resp 12   Wt 90.7 kg (200 lb)   SpO2 95%   BMI 33.28 kg/m²   Physical Exam  Constitutional:       General: She is not in acute distress.     Appearance: Normal appearance. She is not ill-appearing or diaphoretic.   HENT:      Head: Normocephalic and atraumatic.   Eyes:      Conjunctiva/sclera: Conjunctivae normal.   Neurological:      General: No focal deficit present.      Mental Status: She is alert and oriented to person, place, and time.         I have reviewed and reconciled the medication list with the patient today.   Current Outpatient Medications:     albuterol 90 mcg/actuation inhaler, Inhale 2 puffs. EVERY FOUR TO SIX HOURS AS NEEDED, Disp: , Rfl:     amLODIPine (Norvasc) 10 mg tablet, TAKE ONE TABLET BY MOUTH EVERY DAY, Disp: 90 tablet, Rfl: 3    aspirin 81 mg EC tablet, Take 1 tablet (81 mg) by mouth once daily., Disp: , Rfl:     atorvastatin (Lipitor) 10 mg tablet, Take 1 tablet (10 mg) by mouth once daily., Disp: , Rfl:     cloNIDine (Catapres) 0.2 mg tablet, TAKE ONE TABLET BY MOUTH TWICE A DAY, Disp: 180 tablet, Rfl: 3    fexofenadine (Allegra) 180 mg tablet, Take 1 tablet (180 mg) by mouth once daily., Disp: , Rfl:     fluticasone (Flonase) 50 mcg/actuation nasal spray, Administer 1 spray into each nostril once daily. Shake gently. Before first  use, prime pump. After use, clean tip and replace cap., Disp: 16 g, Rfl: 11    hydrocortisone (hydrocortisone-aloe vera) 1 % cream, Apply 1 Application topically 2 times a day., Disp: , Rfl:     omeprazole (PriLOSEC) 20 mg DR capsule, Take 1 capsule (20 mg) by mouth once daily., Disp: 90 capsule, Rfl: 3    potassium chloride CR (Klor-Con M20) 20 mEq ER tablet, Take 2 tablets (40 mEq) by mouth 2 times a day., Disp: , Rfl:     tiZANidine (Zanaflex) 4 mg tablet, Take 1 tablet (4 mg) by mouth 2 times a day., Disp: 30 tablet, Rfl: 1    warfarin (Coumadin) 5 mg tablet, Take 1 tablet (5 mg) by mouth once daily at bedtime. 5mg PO daily, Disp: 90 tablet, Rfl: 3     Imaging:  CT angio chest for pulmonary embolism    Result Date: 10/24/2023  Interpreted By:  Jose Juan Payne, STUDY: CT ANGIO CHEST FOR PULMONARY EMBOLISM;  10/24/2023 2:39 pm   INDICATION: Signs/Symptoms:dyspnea, cough, hx PE.   COMPARISON: 12/15/2021   ACCESSION NUMBER(S): [id number]   ORDERING CLINICIAN: CECILIA MAN   TECHNIQUE: Helical data acquisition of the chest was obtained with 75 mL Omnipaque 350. Images were reformatted in coronal and sagittal planes. Axial and coronal MIP images were created and reviewed.   FINDINGS: POTENTIAL LIMITATIONS OF THE STUDY: None   HEART AND VESSELS: No discrete filling defects within the main pulmonary artery or its branches.   Main pulmonary artery and its branches are normal in caliber.   The thoracic aorta is of normal course and caliber without vascular calcifications.   No coronary artery calcifications are seen.The study is not optimized for evaluation of coronary arteries.   Generalized cardiomegaly.   No evidence of pericardial effusion.   MEDIASTINUM AND CHICO, LOWER NECK AND AXILLA: The visualized thyroid gland is within normal limits.   No evidence of thoracic lymphadenopathy by CT criteria.   Esophagus appears within normal limits as seen.   LUNGS AND AIRWAYS: The trachea and central airways are patent. No  endobronchial lesion. Mild peribronchial thickening within the bilateral lower lobes.   Patchy tree-in-bud densities within the right middle lobe. No effusion or pneumothorax.   UPPER ABDOMEN: Exophytic hypodensity off the posterior wall of the right kidney only partly visualized. Lesion measures 6.8 Hounsfield units likely related to simple cyst. Left kidney is atrophic. Few scattered hepatic hypodensities seen likely related to cysts.   CHEST WALL AND OSSEOUS STRUCTURES: There are no suspicious osseous lesions. Multilevel degenerative changes are present       1.  No pulmonary emboli. 2. Patchy tree-in-bud densities within the right middle lobe as can be seen with acute infectious process. In addition, there is peribronchial thickening within the bilateral lower lobes suggesting bronchitis.     MACRO: None   Signed by: Jose Juan Payne 10/24/2023 2:46 PM Dictation workstation:   [id number]    XR chest 2 views    Result Date: 10/24/2023  Interpreted By:  Celeste Bailey, STUDY: XR CHEST 2 VIEWS;  10/24/2023 9:22 am   INDICATION: Signs/Symptoms:cough, eval for pneumonia.   COMPARISON: 06/01/2020   ACCESSION NUMBER(S): [id number]   ORDERING CLINICIAN: CECILIA MAN   FINDINGS: CARDIOMEDIASTINAL SILHOUETTE: Cardiomediastinal silhouette is normal in size and configuration.     LUNGS: Lungs are clear.   ABDOMEN: No remarkable upper abdominal findings.     BONES: No acute osseous changes.       No acute cardiopulmonary process.   MACRO: None   Signed by: Celeste Bailey 10/24/2023 9:30 AM Dictation workstation:   [id number]       Labs reviewed:    Lab Results   Component Value Date    WBC 5.5 10/24/2023    HGB 13.0 10/24/2023    HCT 39.9 10/24/2023     10/24/2023    CHOL 201 (H) 08/28/2023    TRIG 107 08/28/2023    HDL 58 08/28/2023    ALT 19 10/24/2023    AST 29 10/24/2023     10/24/2023    K 4.0 10/24/2023     10/24/2023    CREATININE 0.92 10/24/2023    BUN 14 10/24/2023    CO2 25 10/24/2023    TSH  0.82 08/28/2023    INR 3.5 (A) 10/30/2023    HGBA1C 6.0 08/28/2023       Assessment/Plan   Problem List Items Addressed This Visit       Acute bronchitis with bronchospasm     Medrol dose pack for wheezing  Phenergan for the cough  Albuterol inhaler         Acute embolism and thrombosis of unspecified deep veins of unspecified lower extremity (CMS/HCC)     Continue Coumadin INR therapeutic.Continue Coumadin         Acute sinusitis     Finished a courseAugmentin and doxycycline Mucinex  Flonase         Atrial fibrillation (CMS/HCC)    Relevant Orders    POCT INR manually resulted (Completed)    Coagulopathy (CMS/HCC) - Primary     Hold coumadin today due to elevated INR. Return in 2 weeks to check INR         Lower back pain     Tylenol for the pain         Hypertension       Continue current medications as listed  Follow up in 2 weeks for INR check

## 2023-10-31 DIAGNOSIS — J20.9 ACUTE BRONCHITIS WITH BRONCHOSPASM: ICD-10-CM

## 2023-10-31 RX ORDER — ALBUTEROL SULFATE 90 UG/1
2 AEROSOL, METERED RESPIRATORY (INHALATION) EVERY 4 HOURS PRN
Qty: 8 G | Refills: 5 | Status: SHIPPED | OUTPATIENT
Start: 2023-10-31 | End: 2024-10-30

## 2023-10-31 RX ORDER — PROMETHAZINE HYDROCHLORIDE AND DEXTROMETHORPHAN HYDROBROMIDE 6.25; 15 MG/5ML; MG/5ML
5 SYRUP ORAL EVERY 8 HOURS PRN
Qty: 120 ML | Refills: 0 | Status: SHIPPED | OUTPATIENT
Start: 2023-10-31 | End: 2023-12-06 | Stop reason: SDUPTHER

## 2023-10-31 RX ORDER — METHYLPREDNISOLONE 4 MG/1
TABLET ORAL
Qty: 21 TABLET | Refills: 0 | Status: SHIPPED | OUTPATIENT
Start: 2023-10-31 | End: 2023-11-07

## 2023-11-07 ENCOUNTER — TELEPHONE (OUTPATIENT)
Dept: PRIMARY CARE | Facility: CLINIC | Age: 68
End: 2023-11-07
Payer: MEDICARE

## 2023-11-07 DIAGNOSIS — Z12.39 BREAST CANCER SCREENING: Primary | ICD-10-CM

## 2023-11-07 DIAGNOSIS — Z12.31 ENCOUNTER FOR SCREENING MAMMOGRAM FOR BREAST CANCER: ICD-10-CM

## 2023-11-16 ENCOUNTER — APPOINTMENT (OUTPATIENT)
Dept: RADIOLOGY | Facility: CLINIC | Age: 68
End: 2023-11-16
Payer: MEDICARE

## 2023-11-17 ENCOUNTER — OFFICE VISIT (OUTPATIENT)
Dept: PRIMARY CARE | Facility: CLINIC | Age: 68
End: 2023-11-17
Payer: MEDICARE

## 2023-11-17 VITALS
RESPIRATION RATE: 16 BRPM | SYSTOLIC BLOOD PRESSURE: 160 MMHG | HEART RATE: 85 BPM | HEIGHT: 65 IN | WEIGHT: 203 LBS | OXYGEN SATURATION: 96 % | BODY MASS INDEX: 33.82 KG/M2 | DIASTOLIC BLOOD PRESSURE: 102 MMHG

## 2023-11-17 DIAGNOSIS — D68.9 COAGULOPATHY (MULTI): ICD-10-CM

## 2023-11-17 DIAGNOSIS — I15.9 SECONDARY HYPERTENSION: Primary | ICD-10-CM

## 2023-11-17 DIAGNOSIS — Z23 FLU VACCINE NEED: ICD-10-CM

## 2023-11-17 LAB
POC INR: 2.2 (ref 0.9–1.1)
POC PROTHROMBIN TIME: 26 (ref 9.3–12.5)

## 2023-11-17 PROCEDURE — 3080F DIAST BP >= 90 MM HG: CPT | Performed by: INTERNAL MEDICINE

## 2023-11-17 PROCEDURE — 90662 IIV NO PRSV INCREASED AG IM: CPT | Performed by: INTERNAL MEDICINE

## 2023-11-17 PROCEDURE — 1159F MED LIST DOCD IN RCRD: CPT | Performed by: INTERNAL MEDICINE

## 2023-11-17 PROCEDURE — 99214 OFFICE O/P EST MOD 30 MIN: CPT | Performed by: INTERNAL MEDICINE

## 2023-11-17 PROCEDURE — 1036F TOBACCO NON-USER: CPT | Performed by: INTERNAL MEDICINE

## 2023-11-17 PROCEDURE — 3077F SYST BP >= 140 MM HG: CPT | Performed by: INTERNAL MEDICINE

## 2023-11-17 PROCEDURE — 3044F HG A1C LEVEL LT 7.0%: CPT | Performed by: INTERNAL MEDICINE

## 2023-11-17 PROCEDURE — 85610 PROTHROMBIN TIME: CPT | Performed by: INTERNAL MEDICINE

## 2023-11-17 PROCEDURE — G0008 ADMIN INFLUENZA VIRUS VAC: HCPCS | Performed by: INTERNAL MEDICINE

## 2023-11-17 PROCEDURE — 1126F AMNT PAIN NOTED NONE PRSNT: CPT | Performed by: INTERNAL MEDICINE

## 2023-11-17 ASSESSMENT — PAIN SCALES - GENERAL: PAINLEVEL: 0-NO PAIN

## 2023-11-17 NOTE — ASSESSMENT & PLAN NOTE
160/102 today. Pt did not take BP medication. Has normotensive readings at home. Continue amlodipine as prescribed

## 2023-11-17 NOTE — PROGRESS NOTES
"Subjective   Chief complaint: Halima Mckeon is a 68 y.o. female who presents for inr and Flu Vaccine.    HPI:  68 year old female presents today for INR check. INR today 2.2 Pt currently taking 5mg Coumadin daily. HTN high on exam today but pt states she did not take her BP medication today. No other health concerns or questions today and feels well overall. No issues with medication taking as prescribed.        Objective   BP (!) 160/102 (BP Location: Right arm, Patient Position: Sitting, BP Cuff Size: Large adult)   Pulse 85   Resp 16   Ht 1.651 m (5' 5\")   Wt 92.1 kg (203 lb)   SpO2 96%   BMI 33.78 kg/m²   Physical Exam  Constitutional:       Appearance: Normal appearance. She is normal weight.   HENT:      Head: Normocephalic and atraumatic.   Eyes:      Extraocular Movements: Extraocular movements intact.      Conjunctiva/sclera: Conjunctivae normal.   Pulmonary:      Effort: Pulmonary effort is normal. No respiratory distress.   Neurological:      General: No focal deficit present.      Mental Status: She is alert and oriented to person, place, and time. Mental status is at baseline.         I have reviewed and reconciled the medication list with the patient today.   Current Outpatient Medications:     albuterol (Ventolin HFA) 90 mcg/actuation inhaler, Inhale 2 puffs every 4 hours if needed for wheezing or shortness of breath., Disp: 8 g, Rfl: 5    albuterol 90 mcg/actuation inhaler, Inhale 2 puffs 3 times a day. EVERY FOUR TO SIX HOURS AS NEEDED, Disp: 18 g, Rfl: 1    amLODIPine (Norvasc) 10 mg tablet, Take 1 tablet (10 mg) by mouth once daily., Disp: 90 tablet, Rfl: 3    aspirin 81 mg EC tablet, Take 1 tablet (81 mg) by mouth once daily., Disp: , Rfl:     atorvastatin (Lipitor) 10 mg tablet, Take 1 tablet (10 mg) by mouth once daily., Disp: , Rfl:     cloNIDine (Catapres) 0.2 mg tablet, TAKE ONE TABLET BY MOUTH TWICE A DAY, Disp: 180 tablet, Rfl: 3    fexofenadine (Allegra) 180 mg tablet, Take 1 tablet " (180 mg) by mouth once daily., Disp: , Rfl:     fluticasone (Flonase) 50 mcg/actuation nasal spray, Administer 1 spray into each nostril once daily. Shake gently. Before first use, prime pump. After use, clean tip and replace cap., Disp: 16 g, Rfl: 11    hydrocortisone (hydrocortisone-aloe vera) 1 % cream, Apply 1 Application topically 2 times a day., Disp: , Rfl:     omeprazole (PriLOSEC) 20 mg DR capsule, Take 1 capsule (20 mg) by mouth once daily., Disp: 90 capsule, Rfl: 3    potassium chloride CR (Klor-Con M20) 20 mEq ER tablet, Take 2 tablets (40 mEq) by mouth 2 times a day., Disp: , Rfl:     promethazine-DM (Phenergan-DM) 6.25-15 mg/5 mL syrup, Take 5 mL by mouth every 8 hours if needed for cough (Take 5-10 mL every 8 hours as needed)., Disp: 120 mL, Rfl: 0    tiZANidine (Zanaflex) 4 mg tablet, Take 1 tablet (4 mg) by mouth 2 times a day., Disp: 30 tablet, Rfl: 1    warfarin (Coumadin) 5 mg tablet, Take 1 tablet (5 mg) by mouth once daily at bedtime. 5mg PO daily, Disp: 90 tablet, Rfl: 3     Imaging:  CT angio chest for pulmonary embolism    Result Date: 10/24/2023  Interpreted By:  Jose Juan Payne, STUDY: CT ANGIO CHEST FOR PULMONARY EMBOLISM;  10/24/2023 2:39 pm   INDICATION: Signs/Symptoms:dyspnea, cough, hx PE.   COMPARISON: 12/15/2021   ACCESSION NUMBER(S): [id number]   ORDERING CLINICIAN: CECILIA MAN   TECHNIQUE: Helical data acquisition of the chest was obtained with 75 mL Omnipaque 350. Images were reformatted in coronal and sagittal planes. Axial and coronal MIP images were created and reviewed.   FINDINGS: POTENTIAL LIMITATIONS OF THE STUDY: None   HEART AND VESSELS: No discrete filling defects within the main pulmonary artery or its branches.   Main pulmonary artery and its branches are normal in caliber.   The thoracic aorta is of normal course and caliber without vascular calcifications.   No coronary artery calcifications are seen.The study is not optimized for evaluation of coronary arteries.    Generalized cardiomegaly.   No evidence of pericardial effusion.   MEDIASTINUM AND CHICO, LOWER NECK AND AXILLA: The visualized thyroid gland is within normal limits.   No evidence of thoracic lymphadenopathy by CT criteria.   Esophagus appears within normal limits as seen.   LUNGS AND AIRWAYS: The trachea and central airways are patent. No endobronchial lesion. Mild peribronchial thickening within the bilateral lower lobes.   Patchy tree-in-bud densities within the right middle lobe. No effusion or pneumothorax.   UPPER ABDOMEN: Exophytic hypodensity off the posterior wall of the right kidney only partly visualized. Lesion measures 6.8 Hounsfield units likely related to simple cyst. Left kidney is atrophic. Few scattered hepatic hypodensities seen likely related to cysts.   CHEST WALL AND OSSEOUS STRUCTURES: There are no suspicious osseous lesions. Multilevel degenerative changes are present       1.  No pulmonary emboli. 2. Patchy tree-in-bud densities within the right middle lobe as can be seen with acute infectious process. In addition, there is peribronchial thickening within the bilateral lower lobes suggesting bronchitis.     MACRO: None   Signed by: Jose Juan Payne 10/24/2023 2:46 PM Dictation workstation:   [id number]    XR chest 2 views    Result Date: 10/24/2023  Interpreted By:  Celeste Bailey, STUDY: XR CHEST 2 VIEWS;  10/24/2023 9:22 am   INDICATION: Signs/Symptoms:cough, eval for pneumonia.   COMPARISON: 06/01/2020   ACCESSION NUMBER(S): [id number]   ORDERING CLINICIAN: CECILIA MAN   FINDINGS: CARDIOMEDIASTINAL SILHOUETTE: Cardiomediastinal silhouette is normal in size and configuration.     LUNGS: Lungs are clear.   ABDOMEN: No remarkable upper abdominal findings.     BONES: No acute osseous changes.       No acute cardiopulmonary process.   MACRO: None   Signed by: Celeste Bailey 10/24/2023 9:30 AM Dictation workstation:   [id number]       Labs reviewed:    Lab Results   Component Value Date     WBC 5.5 10/24/2023    HGB 13.0 10/24/2023    HCT 39.9 10/24/2023     10/24/2023    CHOL 201 (H) 08/28/2023    TRIG 107 08/28/2023    HDL 58 08/28/2023    ALT 19 10/24/2023    AST 29 10/24/2023     10/24/2023    K 4.0 10/24/2023     10/24/2023    CREATININE 0.92 10/24/2023    BUN 14 10/24/2023    CO2 25 10/24/2023    TSH 0.82 08/28/2023    INR 2.2 (A) 11/17/2023    HGBA1C 6.0 08/28/2023       Assessment/Plan   Problem List Items Addressed This Visit       Coagulopathy (CMS/HCC)     INR 2.2 today. Continue Coumadin as prescribed, 5mg daily         Relevant Orders    POCT INR manually resulted (Completed)    Hypertension - Primary     160/102 today. Pt did not take BP medication. Has normotensive readings at home. Continue amlodipine as prescribed          Other Visit Diagnoses       Flu vaccine need        Relevant Orders    Flu vaccine, quadrivalent, high-dose, preservative free, age 65y+ (FLUZONE) (Completed)            Continue current medications as listed  Follow up in 1 month for INR check

## 2023-12-06 DIAGNOSIS — J20.9 ACUTE BRONCHITIS WITH BRONCHOSPASM: ICD-10-CM

## 2023-12-06 RX ORDER — PROMETHAZINE HYDROCHLORIDE AND DEXTROMETHORPHAN HYDROBROMIDE 6.25; 15 MG/5ML; MG/5ML
5 SYRUP ORAL EVERY 8 HOURS PRN
Qty: 120 ML | Refills: 0 | Status: SHIPPED | OUTPATIENT
Start: 2023-12-06 | End: 2023-12-07 | Stop reason: SDUPTHER

## 2023-12-07 DIAGNOSIS — J20.9 ACUTE BRONCHITIS WITH BRONCHOSPASM: ICD-10-CM

## 2023-12-07 RX ORDER — PROMETHAZINE HYDROCHLORIDE AND DEXTROMETHORPHAN HYDROBROMIDE 6.25; 15 MG/5ML; MG/5ML
5 SYRUP ORAL EVERY 8 HOURS PRN
Qty: 120 ML | Refills: 0 | Status: SHIPPED | OUTPATIENT
Start: 2023-12-07 | End: 2024-01-06

## 2023-12-28 ENCOUNTER — APPOINTMENT (OUTPATIENT)
Dept: PRIMARY CARE | Facility: CLINIC | Age: 68
End: 2023-12-28
Payer: MEDICARE

## 2024-01-03 ENCOUNTER — OFFICE VISIT (OUTPATIENT)
Dept: PRIMARY CARE | Facility: CLINIC | Age: 69
End: 2024-01-03
Payer: MEDICARE

## 2024-01-03 VITALS
HEART RATE: 76 BPM | RESPIRATION RATE: 12 BRPM | OXYGEN SATURATION: 97 % | SYSTOLIC BLOOD PRESSURE: 136 MMHG | DIASTOLIC BLOOD PRESSURE: 88 MMHG | WEIGHT: 207 LBS | BODY MASS INDEX: 34.45 KG/M2

## 2024-01-03 DIAGNOSIS — I48.91 ATRIAL FIBRILLATION, UNSPECIFIED TYPE (MULTI): ICD-10-CM

## 2024-01-03 DIAGNOSIS — E66.01 OBESITY, MORBID (MULTI): ICD-10-CM

## 2024-01-03 DIAGNOSIS — I82.401: Primary | ICD-10-CM

## 2024-01-03 DIAGNOSIS — I10 PRIMARY HYPERTENSION: ICD-10-CM

## 2024-01-03 DIAGNOSIS — R60.0 EDEMA OF LOWER EXTREMITY: ICD-10-CM

## 2024-01-03 DIAGNOSIS — E53.8 VITAMIN B12 DEFICIENCY: ICD-10-CM

## 2024-01-03 DIAGNOSIS — M79.10 MYALGIA: ICD-10-CM

## 2024-01-03 DIAGNOSIS — E78.2 MIXED HYPERLIPIDEMIA: ICD-10-CM

## 2024-01-03 LAB
POC INR: 1.9 (ref 0.9–1.1)
POC PROTHROMBIN TIME: 19.8 (ref 9.3–12.5)

## 2024-01-03 PROCEDURE — 96372 THER/PROPH/DIAG INJ SC/IM: CPT | Performed by: INTERNAL MEDICINE

## 2024-01-03 PROCEDURE — 3075F SYST BP GE 130 - 139MM HG: CPT | Performed by: INTERNAL MEDICINE

## 2024-01-03 PROCEDURE — 99214 OFFICE O/P EST MOD 30 MIN: CPT | Performed by: INTERNAL MEDICINE

## 2024-01-03 PROCEDURE — 85610 PROTHROMBIN TIME: CPT | Performed by: INTERNAL MEDICINE

## 2024-01-03 PROCEDURE — 1159F MED LIST DOCD IN RCRD: CPT | Performed by: INTERNAL MEDICINE

## 2024-01-03 PROCEDURE — 1126F AMNT PAIN NOTED NONE PRSNT: CPT | Performed by: INTERNAL MEDICINE

## 2024-01-03 PROCEDURE — 3079F DIAST BP 80-89 MM HG: CPT | Performed by: INTERNAL MEDICINE

## 2024-01-03 PROCEDURE — 1036F TOBACCO NON-USER: CPT | Performed by: INTERNAL MEDICINE

## 2024-01-03 RX ORDER — SEMAGLUTIDE 2.4 MG/.75ML
2.4 INJECTION, SOLUTION SUBCUTANEOUS
Qty: 3 ML | Refills: 0 | Status: SHIPPED | OUTPATIENT
Start: 2024-01-03 | End: 2024-01-25

## 2024-01-03 RX ORDER — CYANOCOBALAMIN 1000 UG/ML
1000 INJECTION, SOLUTION INTRAMUSCULAR; SUBCUTANEOUS ONCE
Status: COMPLETED | OUTPATIENT
Start: 2024-01-03 | End: 2024-01-03

## 2024-01-03 RX ORDER — METHYLPREDNISOLONE ACETATE 80 MG/ML
80 INJECTION, SUSPENSION INTRA-ARTICULAR; INTRALESIONAL; INTRAMUSCULAR; SOFT TISSUE ONCE
Status: COMPLETED | OUTPATIENT
Start: 2024-01-03 | End: 2024-01-03

## 2024-01-03 RX ADMIN — METHYLPREDNISOLONE ACETATE 80 MG: 80 INJECTION, SUSPENSION INTRA-ARTICULAR; INTRALESIONAL; INTRAMUSCULAR; SOFT TISSUE at 14:11

## 2024-01-03 RX ADMIN — CYANOCOBALAMIN 1000 MCG: 1000 INJECTION, SOLUTION INTRAMUSCULAR; SUBCUTANEOUS at 14:11

## 2024-01-03 NOTE — ASSESSMENT & PLAN NOTE
Venous stasis  Low-salt diet  Limit fluid  Elevate the legs  Compression stocking  Restart HCTZ every other day

## 2024-01-03 NOTE — ASSESSMENT & PLAN NOTE
Patient is interested in losing weight patient is overweight and obesity side patient will be started on  Wegovy

## 2024-01-03 NOTE — ASSESSMENT & PLAN NOTE
On warfarin 5mg, INR 1.9 therapeutic continue same treat  Check INR in a week because she is on antibiotic.

## 2024-01-03 NOTE — PROGRESS NOTES
Subjective   Chief complaint: Halima Mckeon is a 68 y.o. female who presents for Subtherapeutic INR (Pt is here for inr check, pt c/o itching all over. Would like B12 and depo).    HPI:  Subtherapeutic INR (Pt is here for inr check, pt c/o itching all over. Would like B12 and depo)        Objective   /88   Pulse 76   Resp 12   Wt 93.9 kg (207 lb)   SpO2 97%   BMI 34.45 kg/m²   Physical Exam  Vitals reviewed.   Constitutional:       Appearance: Normal appearance.   HENT:      Head: Normocephalic and atraumatic.   Cardiovascular:      Rate and Rhythm: Normal rate and regular rhythm.   Pulmonary:      Effort: Pulmonary effort is normal.      Breath sounds: Normal breath sounds.   Abdominal:      General: Bowel sounds are normal.      Palpations: Abdomen is soft.   Musculoskeletal:      Cervical back: Neck supple.   Skin:     General: Skin is warm and dry.   Neurological:      General: No focal deficit present.      Mental Status: She is alert.   Psychiatric:         Mood and Affect: Mood normal.         Behavior: Behavior is cooperative.         I have reviewed and reconciled the medication list with the patient today.   Current Outpatient Medications:     albuterol (Ventolin HFA) 90 mcg/actuation inhaler, Inhale 2 puffs every 4 hours if needed for wheezing or shortness of breath., Disp: 8 g, Rfl: 5    albuterol 90 mcg/actuation inhaler, Inhale 2 puffs 3 times a day. EVERY FOUR TO SIX HOURS AS NEEDED, Disp: 18 g, Rfl: 1    amLODIPine (Norvasc) 10 mg tablet, Take 1 tablet (10 mg) by mouth once daily., Disp: 90 tablet, Rfl: 3    aspirin 81 mg EC tablet, Take 1 tablet (81 mg) by mouth once daily., Disp: , Rfl:     atorvastatin (Lipitor) 10 mg tablet, Take 1 tablet (10 mg) by mouth once daily., Disp: , Rfl:     cloNIDine (Catapres) 0.2 mg tablet, TAKE ONE TABLET BY MOUTH TWICE A DAY, Disp: 180 tablet, Rfl: 3    fexofenadine (Allegra) 180 mg tablet, Take 1 tablet (180 mg) by mouth once daily., Disp: , Rfl:      fluticasone (Flonase) 50 mcg/actuation nasal spray, Administer 1 spray into each nostril once daily. Shake gently. Before first use, prime pump. After use, clean tip and replace cap., Disp: 16 g, Rfl: 11    hydrocortisone (hydrocortisone-aloe vera) 1 % cream, Apply 1 Application topically 2 times a day., Disp: , Rfl:     omeprazole (PriLOSEC) 20 mg DR capsule, Take 1 capsule (20 mg) by mouth once daily., Disp: 90 capsule, Rfl: 3    potassium chloride CR (Klor-Con M20) 20 mEq ER tablet, Take 2 tablets (40 mEq) by mouth 2 times a day., Disp: , Rfl:     promethazine-DM (Phenergan-DM) 6.25-15 mg/5 mL syrup, Take 5 mL by mouth every 8 hours if needed for cough (Take 5-10 mL every 8 hours as needed)., Disp: 120 mL, Rfl: 0    tiZANidine (Zanaflex) 4 mg tablet, Take 1 tablet (4 mg) by mouth 2 times a day., Disp: 30 tablet, Rfl: 1    warfarin (Coumadin) 5 mg tablet, Take 1 tablet (5 mg) by mouth once daily at bedtime. 5mg PO daily, Disp: 90 tablet, Rfl: 3  No current facility-administered medications for this visit.     Imaging:  No results found.     Labs reviewed:    Lab Results   Component Value Date    WBC 5.5 10/24/2023    HGB 13.0 10/24/2023    HCT 39.9 10/24/2023     10/24/2023    CHOL 201 (H) 08/28/2023    TRIG 107 08/28/2023    HDL 58 08/28/2023    ALT 19 10/24/2023    AST 29 10/24/2023     10/24/2023    K 4.0 10/24/2023     10/24/2023    CREATININE 0.92 10/24/2023    BUN 14 10/24/2023    CO2 25 10/24/2023    TSH 0.82 08/28/2023    INR 1.9 (A) 01/03/2024    HGBA1C 6.0 08/28/2023       Assessment/Plan   Problem List Items Addressed This Visit       Acute embolism and thrombosis of unspecified deep veins of unspecified lower extremity (CMS/HCC) - Primary     RibContinue Coumadin INR 1.9 therapeutic.Continue Coumadin         Atrial fibrillation (CMS/HCC)     On warfarin 5mg, INR 1.9 therapeutic continue same treat  Check INR in a week because she is on antibiotic.         Relevant Orders    POCT INR  manually resulted (Completed)    Hyperlipidemia     WNL per lipid panel.         Edema of lower extremity     Venous stasis  Low-salt diet  Limit fluid  Elevate the legs  Compression stocking  Restart HCTZ every other day         Obesity, morbid (CMS/HCC)     Patient is interested in losing weight patient is overweight and obesity side patient will be started on  Wegovy         Hypertension     160/102 today. Pt did not take BP medication. Has normotensive readings at home. Continue amlodipine as prescribed         Vitamin B12 deficiency    Relevant Medications    cyanocobalamin (Vitamin B-12) injection 1,000 mcg (Completed)    Myalgia     Arthritic pain with joint pain Depo 80 mg         Relevant Medications    methylPREDNISolone acetate (DEPO-Medrol) injection 80 mg (Completed)       Continue current medications as listed  Follow up in 1 months check INR check the weight loss.

## 2024-01-04 ENCOUNTER — APPOINTMENT (OUTPATIENT)
Dept: PRIMARY CARE | Facility: CLINIC | Age: 69
End: 2024-01-04
Payer: MEDICARE

## 2024-01-22 DIAGNOSIS — E78.5 HYPERLIPIDEMIA, UNSPECIFIED HYPERLIPIDEMIA TYPE: ICD-10-CM

## 2024-01-22 DIAGNOSIS — E66.01 OBESITY, MORBID (MULTI): ICD-10-CM

## 2024-01-22 DIAGNOSIS — I10 BENIGN ESSENTIAL HYPERTENSION: ICD-10-CM

## 2024-01-22 DIAGNOSIS — R73.9 HYPERGLYCEMIA, UNSPECIFIED: ICD-10-CM

## 2024-01-22 DIAGNOSIS — M54.50 LOW BACK PAIN, UNSPECIFIED BACK PAIN LATERALITY, UNSPECIFIED CHRONICITY, UNSPECIFIED WHETHER SCIATICA PRESENT: ICD-10-CM

## 2024-01-22 DIAGNOSIS — E78.00 ELEVATED LDL CHOLESTEROL LEVEL: ICD-10-CM

## 2024-01-22 DIAGNOSIS — I10 PRIMARY HYPERTENSION: ICD-10-CM

## 2024-01-22 DIAGNOSIS — E03.9 HYPOTHYROIDISM, UNSPECIFIED TYPE: ICD-10-CM

## 2024-01-22 DIAGNOSIS — E87.6 HYPOKALEMIA: ICD-10-CM

## 2024-01-22 DIAGNOSIS — D50.9 IRON DEFICIENCY ANEMIA, UNSPECIFIED IRON DEFICIENCY ANEMIA TYPE: ICD-10-CM

## 2024-01-23 RX ORDER — TIZANIDINE 4 MG/1
4 TABLET ORAL 2 TIMES DAILY
Qty: 30 TABLET | Refills: 1 | Status: SHIPPED | OUTPATIENT
Start: 2024-01-23 | End: 2024-04-22 | Stop reason: SDUPTHER

## 2024-01-31 DIAGNOSIS — I48.91 ATRIAL FIBRILLATION, UNSPECIFIED TYPE (MULTI): ICD-10-CM

## 2024-01-31 RX ORDER — WARFARIN SODIUM 5 MG/1
5 TABLET ORAL NIGHTLY
Qty: 90 TABLET | Refills: 3 | Status: SHIPPED | OUTPATIENT
Start: 2024-01-31 | End: 2024-03-07 | Stop reason: SDUPTHER

## 2024-02-08 ENCOUNTER — APPOINTMENT (OUTPATIENT)
Dept: PRIMARY CARE | Facility: CLINIC | Age: 69
End: 2024-02-08
Payer: MEDICARE

## 2024-02-13 ENCOUNTER — LAB (OUTPATIENT)
Dept: LAB | Facility: LAB | Age: 69
End: 2024-02-13
Payer: MEDICARE

## 2024-02-13 DIAGNOSIS — E87.6 HYPOKALEMIA: ICD-10-CM

## 2024-02-13 DIAGNOSIS — E66.01 OBESITY, MORBID (MULTI): ICD-10-CM

## 2024-02-13 DIAGNOSIS — R73.9 HYPERGLYCEMIA, UNSPECIFIED: ICD-10-CM

## 2024-02-13 DIAGNOSIS — E78.5 HYPERLIPIDEMIA, UNSPECIFIED HYPERLIPIDEMIA TYPE: ICD-10-CM

## 2024-02-13 LAB
ALBUMIN SERPL-MCNC: 3.8 G/DL (ref 3.5–5)
ALP BLD-CCNC: 106 U/L (ref 35–125)
ALT SERPL-CCNC: 11 U/L (ref 5–40)
ANION GAP SERPL CALC-SCNC: 10 MMOL/L
AST SERPL-CCNC: 17 U/L (ref 5–40)
BILIRUB SERPL-MCNC: 0.4 MG/DL (ref 0.1–1.2)
BUN SERPL-MCNC: 18 MG/DL (ref 8–25)
CALCIUM SERPL-MCNC: 9.7 MG/DL (ref 8.5–10.4)
CHLORIDE SERPL-SCNC: 108 MMOL/L (ref 97–107)
CHOLEST SERPL-MCNC: 267 MG/DL (ref 133–200)
CHOLEST/HDLC SERPL: 4.4 {RATIO}
CO2 SERPL-SCNC: 24 MMOL/L (ref 24–31)
CREAT SERPL-MCNC: 1 MG/DL (ref 0.4–1.6)
EGFRCR SERPLBLD CKD-EPI 2021: 62 ML/MIN/1.73M*2
EST. AVERAGE GLUCOSE BLD GHB EST-MCNC: 120 MG/DL
GLUCOSE SERPL-MCNC: 78 MG/DL (ref 65–99)
HBA1C MFR BLD: 5.8 %
HDLC SERPL-MCNC: 61 MG/DL
LDLC SERPL CALC-MCNC: 185 MG/DL (ref 65–130)
POTASSIUM SERPL-SCNC: 3.9 MMOL/L (ref 3.4–5.1)
PROT SERPL-MCNC: 6.6 G/DL (ref 5.9–7.9)
SODIUM SERPL-SCNC: 142 MMOL/L (ref 133–145)
TRIGL SERPL-MCNC: 104 MG/DL (ref 40–150)

## 2024-02-13 PROCEDURE — 80061 LIPID PANEL: CPT

## 2024-02-13 PROCEDURE — 36415 COLL VENOUS BLD VENIPUNCTURE: CPT

## 2024-02-13 PROCEDURE — 80053 COMPREHEN METABOLIC PANEL: CPT

## 2024-02-13 PROCEDURE — 83036 HEMOGLOBIN GLYCOSYLATED A1C: CPT

## 2024-02-15 DIAGNOSIS — R60.0 EDEMA OF LOWER EXTREMITY: ICD-10-CM

## 2024-02-15 DIAGNOSIS — M25.569 ARTHRALGIA OF KNEE, UNSPECIFIED LATERALITY: ICD-10-CM

## 2024-02-16 RX ORDER — POTASSIUM CHLORIDE 20 MEQ/1
40 TABLET, EXTENDED RELEASE ORAL 2 TIMES DAILY
Qty: 180 TABLET | Refills: 3 | Status: SHIPPED | OUTPATIENT
Start: 2024-02-16

## 2024-02-16 RX ORDER — DICLOFENAC SODIUM 10 MG/G
4 GEL TOPICAL 4 TIMES DAILY
Qty: 100 G | Refills: 3 | Status: SHIPPED | OUTPATIENT
Start: 2024-02-16

## 2024-02-20 ENCOUNTER — APPOINTMENT (OUTPATIENT)
Dept: PRIMARY CARE | Facility: CLINIC | Age: 69
End: 2024-02-20
Payer: MEDICARE

## 2024-02-26 ENCOUNTER — OFFICE VISIT (OUTPATIENT)
Dept: PRIMARY CARE | Facility: CLINIC | Age: 69
End: 2024-02-26
Payer: MEDICARE

## 2024-02-26 VITALS
WEIGHT: 204 LBS | SYSTOLIC BLOOD PRESSURE: 138 MMHG | BODY MASS INDEX: 33.95 KG/M2 | HEART RATE: 72 BPM | OXYGEN SATURATION: 93 % | DIASTOLIC BLOOD PRESSURE: 79 MMHG | RESPIRATION RATE: 12 BRPM

## 2024-02-26 DIAGNOSIS — I10 PRIMARY HYPERTENSION: ICD-10-CM

## 2024-02-26 DIAGNOSIS — G47.33 OSA (OBSTRUCTIVE SLEEP APNEA): Primary | ICD-10-CM

## 2024-02-26 DIAGNOSIS — R19.5 POSITIVE COLORECTAL CANCER SCREENING USING COLOGUARD TEST: ICD-10-CM

## 2024-02-26 DIAGNOSIS — E53.8 VITAMIN B12 DEFICIENCY: ICD-10-CM

## 2024-02-26 DIAGNOSIS — I48.91 ATRIAL FIBRILLATION, UNSPECIFIED TYPE (MULTI): ICD-10-CM

## 2024-02-26 DIAGNOSIS — E78.2 MIXED HYPERLIPIDEMIA: ICD-10-CM

## 2024-02-26 DIAGNOSIS — E66.01 OBESITY, MORBID (MULTI): ICD-10-CM

## 2024-02-26 PROBLEM — E66.3 OVERWEIGHT: Status: ACTIVE | Noted: 2024-02-26

## 2024-02-26 LAB
POC INR: 2.6 (ref 0.9–1.1)
POC PROTHROMBIN TIME: 31.7 (ref 9.3–12.5)

## 2024-02-26 PROCEDURE — 1126F AMNT PAIN NOTED NONE PRSNT: CPT | Performed by: INTERNAL MEDICINE

## 2024-02-26 PROCEDURE — 3078F DIAST BP <80 MM HG: CPT | Performed by: INTERNAL MEDICINE

## 2024-02-26 PROCEDURE — 96372 THER/PROPH/DIAG INJ SC/IM: CPT | Performed by: INTERNAL MEDICINE

## 2024-02-26 PROCEDURE — 3044F HG A1C LEVEL LT 7.0%: CPT | Performed by: INTERNAL MEDICINE

## 2024-02-26 PROCEDURE — 1159F MED LIST DOCD IN RCRD: CPT | Performed by: INTERNAL MEDICINE

## 2024-02-26 PROCEDURE — 99214 OFFICE O/P EST MOD 30 MIN: CPT | Performed by: INTERNAL MEDICINE

## 2024-02-26 PROCEDURE — 3050F LDL-C >= 130 MG/DL: CPT | Performed by: INTERNAL MEDICINE

## 2024-02-26 PROCEDURE — 1036F TOBACCO NON-USER: CPT | Performed by: INTERNAL MEDICINE

## 2024-02-26 PROCEDURE — 85610 PROTHROMBIN TIME: CPT | Performed by: INTERNAL MEDICINE

## 2024-02-26 PROCEDURE — 3075F SYST BP GE 130 - 139MM HG: CPT | Performed by: INTERNAL MEDICINE

## 2024-02-26 RX ORDER — CYANOCOBALAMIN 1000 UG/ML
1000 INJECTION, SOLUTION INTRAMUSCULAR; SUBCUTANEOUS ONCE
Status: COMPLETED | OUTPATIENT
Start: 2024-02-26 | End: 2024-02-26

## 2024-02-26 RX ADMIN — CYANOCOBALAMIN 1000 MCG: 1000 INJECTION, SOLUTION INTRAMUSCULAR; SUBCUTANEOUS at 15:21

## 2024-02-26 NOTE — PROGRESS NOTES
Subjective   Chief complaint: Halima Mckeon is a 68 y.o. female who presents for Follow-up (Pt is here for blood work  follow up. ).    HPI:  Follow-up back Coumadin therapy INR is checked and is therapeutic 2.0 continue same Coumadin dosage.  Follow-up treatment for weight patient is overweight for by 20 pound over 10% was denied the Ozempic  Will change it to medication only address the weight loss like zippound.  Follow-up hypertension  Follow-up hyperlipidemia 2.64 weeks.  In 4 weeks.  4 weeks.          Objective   /79   Pulse 72   Resp 12   Wt 92.5 kg (204 lb)   SpO2 93%   BMI 33.95 kg/m²   Physical Exam  Vitals reviewed.   Constitutional:       Appearance: Normal appearance.   HENT:      Head: Normocephalic and atraumatic.   Cardiovascular:      Rate and Rhythm: Normal rate and regular rhythm.   Pulmonary:      Effort: Pulmonary effort is normal.      Breath sounds: Normal breath sounds.   Abdominal:      General: Bowel sounds are normal.      Palpations: Abdomen is soft.   Musculoskeletal:      Cervical back: Neck supple.   Skin:     General: Skin is warm and dry.   Neurological:      General: No focal deficit present.      Mental Status: She is alert.   Psychiatric:         Mood and Affect: Mood normal.         Behavior: Behavior is cooperative.         I have reviewed and reconciled the medication list with the patient today.   Current Outpatient Medications:     albuterol (Ventolin HFA) 90 mcg/actuation inhaler, Inhale 2 puffs every 4 hours if needed for wheezing or shortness of breath., Disp: 8 g, Rfl: 5    albuterol 90 mcg/actuation inhaler, Inhale 2 puffs 3 times a day. EVERY FOUR TO SIX HOURS AS NEEDED, Disp: 18 g, Rfl: 1    amLODIPine (Norvasc) 10 mg tablet, Take 1 tablet (10 mg) by mouth once daily., Disp: 90 tablet, Rfl: 3    aspirin 81 mg EC tablet, Take 1 tablet (81 mg) by mouth once daily., Disp: , Rfl:     atorvastatin (Lipitor) 10 mg tablet, Take 1 tablet (10 mg) by mouth once  daily., Disp: , Rfl:     cloNIDine (Catapres) 0.2 mg tablet, TAKE ONE TABLET BY MOUTH TWICE A DAY, Disp: 180 tablet, Rfl: 3    diclofenac sodium (Voltaren) 1 % gel, Apply 4.5 inches (4 g) topically 4 times a day., Disp: 100 g, Rfl: 3    fexofenadine (Allegra) 180 mg tablet, Take 1 tablet (180 mg) by mouth once daily., Disp: , Rfl:     fluticasone (Flonase) 50 mcg/actuation nasal spray, Administer 1 spray into each nostril once daily. Shake gently. Before first use, prime pump. After use, clean tip and replace cap., Disp: 16 g, Rfl: 11    hydrocortisone (hydrocortisone-aloe vera) 1 % cream, Apply 1 Application topically 2 times a day., Disp: , Rfl:     omeprazole (PriLOSEC) 20 mg DR capsule, Take 1 capsule (20 mg) by mouth once daily., Disp: 90 capsule, Rfl: 3    potassium chloride CR 20 mEq ER tablet, Take 2 tablets (40 mEq) by mouth 2 times a day., Disp: 180 tablet, Rfl: 3    semaglutide, weight loss, (Wegovy) 2.4 mg/0.75 mL pen injector, Inject 2.4 mg under the skin 1 (one) time per week for 4 doses., Disp: 3 mL, Rfl: 0    tiZANidine (Zanaflex) 4 mg tablet, Take 1 tablet (4 mg) by mouth 2 times a day., Disp: 30 tablet, Rfl: 1    warfarin (Coumadin) 5 mg tablet, Take 1 tablet (5 mg) by mouth once daily at bedtime. 5mg PO daily, Disp: 90 tablet, Rfl: 3  No current facility-administered medications for this visit.     Imaging:  No results found.     Labs reviewed:    Lab Results   Component Value Date    WBC 5.5 10/24/2023    HGB 13.0 10/24/2023    HCT 39.9 10/24/2023     10/24/2023    CHOL 267 (H) 02/13/2024    TRIG 104 02/13/2024    HDL 61.0 02/13/2024    ALT 11 02/13/2024    AST 17 02/13/2024     02/13/2024    K 3.9 02/13/2024     (H) 02/13/2024    CREATININE 1.00 02/13/2024    BUN 18 02/13/2024    CO2 24 02/13/2024    TSH 0.82 08/28/2023    INR 2.6 (A) 02/26/2024    HGBA1C 5.8 (H) 02/13/2024       Assessment/Plan   Problem List Items Addressed This Visit       Atrial fibrillation (CMS/HCC)      On warfarin 5mg, INR 1.9 therapeutic continue same treat  Check INR in 4 weeks.         Relevant Orders    POCT INR manually resulted (Completed)    Hyperlipidemia     LDL is still elevated at I will increase Lipitor to 20 mg daily from 10 lipid in 3 months         MENG (obstructive sleep apnea) - Primary     CPAP machine         Hypertension     Continue amlodipine as prescribed         Vitamin B12 deficiency     Received B12 shot today.         Relevant Medications    cyanocobalamin (Vitamin B-12) injection 1,000 mcg (Completed) (Start on 2/26/2024  3:45 PM)     Other Visit Diagnoses       Positive colorectal cancer screening using Cologuard test        Relevant Orders    Colonoscopy Diagnostic            Continue current medications as listed  Follow up in 1 months for INR

## 2024-03-07 DIAGNOSIS — I48.91 ATRIAL FIBRILLATION, UNSPECIFIED TYPE (MULTI): ICD-10-CM

## 2024-03-07 RX ORDER — WARFARIN SODIUM 5 MG/1
5 TABLET ORAL NIGHTLY
Qty: 90 TABLET | Refills: 3 | Status: SHIPPED | OUTPATIENT
Start: 2024-03-07 | End: 2024-03-25 | Stop reason: SDUPTHER

## 2024-03-08 ENCOUNTER — DOCUMENTATION (OUTPATIENT)
Dept: SURGERY | Facility: HOSPITAL | Age: 69
End: 2024-03-08
Payer: MEDICARE

## 2024-03-08 NOTE — PROGRESS NOTES
Patient called and left a vcml on the TING line inquiring about restarting the program. A [employer] message was sent to advise on how to re-engage.

## 2024-03-25 ENCOUNTER — DOCUMENTATION (OUTPATIENT)
Dept: SURGERY | Facility: HOSPITAL | Age: 69
End: 2024-03-25

## 2024-03-25 ENCOUNTER — APPOINTMENT (OUTPATIENT)
Dept: PRIMARY CARE | Facility: CLINIC | Age: 69
End: 2024-03-25
Payer: MEDICARE

## 2024-03-25 DIAGNOSIS — I48.91 ATRIAL FIBRILLATION, UNSPECIFIED TYPE (MULTI): ICD-10-CM

## 2024-03-25 RX ORDER — WARFARIN SODIUM 5 MG/1
5 TABLET ORAL NIGHTLY
Qty: 90 TABLET | Refills: 3 | Status: SHIPPED | OUTPATIENT
Start: 2024-03-25

## 2024-03-25 NOTE — PROGRESS NOTES
Received another vcml from the pt asking how to get started. I called and left the patient a voicemail to advise on prior message sent to [employer] with instructions on how to get started.

## 2024-04-01 ENCOUNTER — OFFICE VISIT (OUTPATIENT)
Dept: PRIMARY CARE | Facility: CLINIC | Age: 69
End: 2024-04-01
Payer: MEDICARE

## 2024-04-01 VITALS
OXYGEN SATURATION: 97 % | BODY MASS INDEX: 34.45 KG/M2 | SYSTOLIC BLOOD PRESSURE: 136 MMHG | DIASTOLIC BLOOD PRESSURE: 84 MMHG | HEART RATE: 76 BPM | RESPIRATION RATE: 12 BRPM | WEIGHT: 207 LBS

## 2024-04-01 DIAGNOSIS — I48.91 ATRIAL FIBRILLATION, UNSPECIFIED TYPE (MULTI): ICD-10-CM

## 2024-04-01 DIAGNOSIS — E66.3 OVERWEIGHT: ICD-10-CM

## 2024-04-01 DIAGNOSIS — E53.8 VITAMIN B12 DEFICIENCY: ICD-10-CM

## 2024-04-01 DIAGNOSIS — J30.9 ALLERGIC RHINITIS, UNSPECIFIED SEASONALITY, UNSPECIFIED TRIGGER: ICD-10-CM

## 2024-04-01 DIAGNOSIS — T82.867A THROMBOSIS DUE TO CARDIAC PROSTHETIC DEVICES, IMPLANTS AND GRAFTS, INITIAL ENCOUNTER: ICD-10-CM

## 2024-04-01 DIAGNOSIS — N18.31 STAGE 3A CHRONIC KIDNEY DISEASE (MULTI): Primary | ICD-10-CM

## 2024-04-01 LAB
POC INR: 2.9 (ref 0.9–1.1)
POC PROTHROMBIN TIME: 34.9 (ref 9.3–12.5)

## 2024-04-01 PROCEDURE — 85610 PROTHROMBIN TIME: CPT | Performed by: INTERNAL MEDICINE

## 2024-04-01 PROCEDURE — 1159F MED LIST DOCD IN RCRD: CPT | Performed by: INTERNAL MEDICINE

## 2024-04-01 PROCEDURE — 3044F HG A1C LEVEL LT 7.0%: CPT | Performed by: INTERNAL MEDICINE

## 2024-04-01 PROCEDURE — 1036F TOBACCO NON-USER: CPT | Performed by: INTERNAL MEDICINE

## 2024-04-01 PROCEDURE — 99214 OFFICE O/P EST MOD 30 MIN: CPT | Performed by: INTERNAL MEDICINE

## 2024-04-01 PROCEDURE — 96372 THER/PROPH/DIAG INJ SC/IM: CPT | Performed by: INTERNAL MEDICINE

## 2024-04-01 PROCEDURE — 3079F DIAST BP 80-89 MM HG: CPT | Performed by: INTERNAL MEDICINE

## 2024-04-01 PROCEDURE — 3075F SYST BP GE 130 - 139MM HG: CPT | Performed by: INTERNAL MEDICINE

## 2024-04-01 PROCEDURE — 3050F LDL-C >= 130 MG/DL: CPT | Performed by: INTERNAL MEDICINE

## 2024-04-01 RX ORDER — CYANOCOBALAMIN 1000 UG/ML
1000 INJECTION, SOLUTION INTRAMUSCULAR; SUBCUTANEOUS ONCE
Status: COMPLETED | OUTPATIENT
Start: 2024-04-01 | End: 2024-04-01

## 2024-04-01 RX ORDER — TRIAMCINOLONE ACETONIDE 40 MG/ML
40 INJECTION, SUSPENSION INTRA-ARTICULAR; INTRAMUSCULAR ONCE
Status: COMPLETED | OUTPATIENT
Start: 2024-04-01 | End: 2024-04-01

## 2024-04-01 RX ADMIN — CYANOCOBALAMIN 1000 MCG: 1000 INJECTION, SOLUTION INTRAMUSCULAR; SUBCUTANEOUS at 14:33

## 2024-04-01 RX ADMIN — TRIAMCINOLONE ACETONIDE 40 MG: 40 INJECTION, SUSPENSION INTRA-ARTICULAR; INTRAMUSCULAR at 14:33

## 2024-04-01 NOTE — PROGRESS NOTES
Subjective   Chief complaint: Halima Mckeon is a 68 y.o. female who presents for Subtherapeutic INR (Pt is here for INR check and would like her allergy and B12 shot.).    HPI:  Follow-up INR patient is on Coumadin.  Today is complaining of allergy to cough the sneezing shortness of breath would like to have a steroid injection        Objective   /84   Pulse 76   Resp 12   Wt 93.9 kg (207 lb)   SpO2 97%   BMI 34.45 kg/m²   Physical Exam  Vitals reviewed.   Constitutional:       Appearance: Normal appearance.   HENT:      Head: Normocephalic and atraumatic.   Cardiovascular:      Rate and Rhythm: Normal rate and regular rhythm.   Pulmonary:      Effort: Pulmonary effort is normal.      Breath sounds: Normal breath sounds.   Abdominal:      General: Bowel sounds are normal.      Palpations: Abdomen is soft.   Musculoskeletal:      Cervical back: Neck supple.   Skin:     General: Skin is warm and dry.   Neurological:      General: No focal deficit present.      Mental Status: She is alert.   Psychiatric:         Mood and Affect: Mood normal.         Behavior: Behavior is cooperative.         I have reviewed and reconciled the medication list with the patient today.   Current Outpatient Medications:     albuterol (Ventolin HFA) 90 mcg/actuation inhaler, Inhale 2 puffs every 4 hours if needed for wheezing or shortness of breath., Disp: 8 g, Rfl: 5    albuterol 90 mcg/actuation inhaler, Inhale 2 puffs 3 times a day. EVERY FOUR TO SIX HOURS AS NEEDED, Disp: 18 g, Rfl: 1    amLODIPine (Norvasc) 10 mg tablet, Take 1 tablet (10 mg) by mouth once daily., Disp: 90 tablet, Rfl: 3    aspirin 81 mg EC tablet, Take 1 tablet (81 mg) by mouth once daily., Disp: , Rfl:     atorvastatin (Lipitor) 10 mg tablet, Take 1 tablet (10 mg) by mouth once daily., Disp: , Rfl:     cloNIDine (Catapres) 0.2 mg tablet, TAKE ONE TABLET BY MOUTH TWICE A DAY, Disp: 180 tablet, Rfl: 3    diclofenac sodium (Voltaren) 1 % gel, Apply 4.5 inches  (4 g) topically 4 times a day., Disp: 100 g, Rfl: 3    fexofenadine (Allegra) 180 mg tablet, Take 1 tablet (180 mg) by mouth once daily., Disp: , Rfl:     fluticasone (Flonase) 50 mcg/actuation nasal spray, Administer 1 spray into each nostril once daily. Shake gently. Before first use, prime pump. After use, clean tip and replace cap., Disp: 16 g, Rfl: 11    hydrocortisone (hydrocortisone-aloe vera) 1 % cream, Apply 1 Application topically 2 times a day., Disp: , Rfl:     omeprazole (PriLOSEC) 20 mg DR capsule, Take 1 capsule (20 mg) by mouth once daily., Disp: 90 capsule, Rfl: 3    potassium chloride CR 20 mEq ER tablet, Take 2 tablets (40 mEq) by mouth 2 times a day., Disp: 180 tablet, Rfl: 3    semaglutide, weight loss, (Wegovy) 2.4 mg/0.75 mL pen injector, Inject 2.4 mg under the skin 1 (one) time per week for 4 doses., Disp: 3 mL, Rfl: 0    tirzepatide, weight loss, (Zepbound) 2.5 mg/0.5 mL injection, Inject 2.5 mg under the skin every 7 days., Disp: 4 each, Rfl: 0    tiZANidine (Zanaflex) 4 mg tablet, Take 1 tablet (4 mg) by mouth 2 times a day., Disp: 30 tablet, Rfl: 1    warfarin (Coumadin) 5 mg tablet, Take 1 tablet (5 mg) by mouth once daily at bedtime. 5mg PO daily, Disp: 90 tablet, Rfl: 3    Current Facility-Administered Medications:     cyanocobalamin (Vitamin B-12) injection 1,000 mcg, 1,000 mcg, intramuscular, Once, Elma Ramos MD    triamcinolone acetonide (Kenalog-40) injection 40 mg, 40 mg, intramuscular, Once, Elma Ramos MD     Imaging:  No results found.     Labs reviewed:    Lab Results   Component Value Date    WBC 5.5 10/24/2023    HGB 13.0 10/24/2023    HCT 39.9 10/24/2023     10/24/2023    CHOL 267 (H) 02/13/2024    TRIG 104 02/13/2024    HDL 61.0 02/13/2024    ALT 11 02/13/2024    AST 17 02/13/2024     02/13/2024    K 3.9 02/13/2024     (H) 02/13/2024    CREATININE 1.00 02/13/2024    BUN 18 02/13/2024    CO2 24 02/13/2024    TSH 0.82 08/28/2023    INR 2.6 (A)  02/26/2024    HGBA1C 5.8 (H) 02/13/2024       Assessment/Plan   Problem List Items Addressed This Visit       Allergic rhinitis     Received depo  today.         Relevant Medications    triamcinolone acetonide (Kenalog-40) injection 40 mg (Start on 4/1/2024  3:00 PM)    Atrial fibrillation (CMS/HCC)     On warfarin 5mg, INR 2.7 therapeutic continue same treat  Check INR in 4 weeks.         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         Relevant Medications    cyanocobalamin (Vitamin B-12) injection 1,000 mcg (Start on 4/1/2024  3:00 PM)    Overweight       Continue current medications as listed  Follow up in 1 month

## 2024-04-01 NOTE — ASSESSMENT & PLAN NOTE
Patient called asking for visit for Monday he cannot get out of work today, I put him in at 2:30   His blood pressure has been running higher at 147/104, his blood vessels in his eyes  Were even swelling, and he was having slight headaches daily, little dizzy  This has been for two weeks  He's not sure if this is from sleep apnea, you discussed via text about testing this further  He will monitor readings and seek medical attention if needed unless he hears back from us  Will continue to monitor renal function

## 2024-04-10 ENCOUNTER — TELEPHONE (OUTPATIENT)
Dept: SURGERY | Facility: CLINIC | Age: 69
End: 2024-04-10
Payer: MEDICARE

## 2024-04-22 ENCOUNTER — OFFICE VISIT (OUTPATIENT)
Dept: PRIMARY CARE | Facility: CLINIC | Age: 69
End: 2024-04-22
Payer: MEDICARE

## 2024-04-22 VITALS
RESPIRATION RATE: 12 BRPM | HEART RATE: 75 BPM | WEIGHT: 203 LBS | BODY MASS INDEX: 33.78 KG/M2 | OXYGEN SATURATION: 95 % | SYSTOLIC BLOOD PRESSURE: 142 MMHG | DIASTOLIC BLOOD PRESSURE: 82 MMHG

## 2024-04-22 DIAGNOSIS — I15.9 SECONDARY HYPERTENSION: ICD-10-CM

## 2024-04-22 DIAGNOSIS — M54.50 LOW BACK PAIN, UNSPECIFIED BACK PAIN LATERALITY, UNSPECIFIED CHRONICITY, UNSPECIFIED WHETHER SCIATICA PRESENT: ICD-10-CM

## 2024-04-22 DIAGNOSIS — I48.91 ATRIAL FIBRILLATION, UNSPECIFIED TYPE (MULTI): ICD-10-CM

## 2024-04-22 LAB
POC INR: 2.7 (ref 0.9–1.1)
POC PROTHROMBIN TIME: 32.2 (ref 9.3–12.5)

## 2024-04-22 PROCEDURE — 3077F SYST BP >= 140 MM HG: CPT | Performed by: INTERNAL MEDICINE

## 2024-04-22 PROCEDURE — 3050F LDL-C >= 130 MG/DL: CPT | Performed by: INTERNAL MEDICINE

## 2024-04-22 PROCEDURE — 1159F MED LIST DOCD IN RCRD: CPT | Performed by: INTERNAL MEDICINE

## 2024-04-22 PROCEDURE — 1036F TOBACCO NON-USER: CPT | Performed by: INTERNAL MEDICINE

## 2024-04-22 PROCEDURE — 85610 PROTHROMBIN TIME: CPT | Performed by: INTERNAL MEDICINE

## 2024-04-22 PROCEDURE — 99213 OFFICE O/P EST LOW 20 MIN: CPT | Performed by: INTERNAL MEDICINE

## 2024-04-22 PROCEDURE — 3044F HG A1C LEVEL LT 7.0%: CPT | Performed by: INTERNAL MEDICINE

## 2024-04-22 PROCEDURE — 3079F DIAST BP 80-89 MM HG: CPT | Performed by: INTERNAL MEDICINE

## 2024-04-22 RX ORDER — TIZANIDINE 4 MG/1
4 TABLET ORAL 2 TIMES DAILY
Qty: 30 TABLET | Refills: 1 | Status: SHIPPED | OUTPATIENT
Start: 2024-04-22

## 2024-04-22 RX ORDER — AMLODIPINE BESYLATE 10 MG/1
10 TABLET ORAL DAILY
Qty: 90 TABLET | Refills: 3 | Status: SHIPPED | OUTPATIENT
Start: 2024-04-22

## 2024-04-22 NOTE — PROGRESS NOTES
Subjective   Chief complaint: Halima Mckeon is a 68 y.o. female who presents for Subtherapeutic INR (Pt is here for her INR check and medication refills. ).    HPI:  68 y.o. female reports to the office for follow up of anticoagulation. INR today 2.7. Pt feeling well.         Objective   /82   Pulse 75   Resp 12   Wt 92.1 kg (203 lb)   SpO2 95%   BMI 33.78 kg/m²   Physical Exam  Constitutional:       Appearance: Normal appearance.   HENT:      Head: Normocephalic and atraumatic.      Mouth/Throat:      Mouth: Mucous membranes are moist.      Pharynx: Oropharynx is clear.   Eyes:      Pupils: Pupils are equal, round, and reactive to light.   Cardiovascular:      Rate and Rhythm: Normal rate and regular rhythm.   Pulmonary:      Effort: Pulmonary effort is normal. No respiratory distress.   Abdominal:      General: Abdomen is flat. There is no distension.   Musculoskeletal:         General: Normal range of motion.      Cervical back: Normal range of motion.   Skin:     General: Skin is warm and dry.   Neurological:      General: No focal deficit present.      Mental Status: She is alert.         I have reviewed and reconciled the medication list with the patient today.   Current Outpatient Medications:     albuterol (Ventolin HFA) 90 mcg/actuation inhaler, Inhale 2 puffs every 4 hours if needed for wheezing or shortness of breath., Disp: 8 g, Rfl: 5    albuterol 90 mcg/actuation inhaler, Inhale 2 puffs 3 times a day. EVERY FOUR TO SIX HOURS AS NEEDED, Disp: 18 g, Rfl: 1    amLODIPine (Norvasc) 10 mg tablet, Take 1 tablet (10 mg) by mouth once daily., Disp: 90 tablet, Rfl: 3    aspirin 81 mg EC tablet, Take 1 tablet (81 mg) by mouth once daily., Disp: , Rfl:     atorvastatin (Lipitor) 10 mg tablet, Take 1 tablet (10 mg) by mouth once daily., Disp: , Rfl:     cloNIDine (Catapres) 0.2 mg tablet, TAKE ONE TABLET BY MOUTH TWICE A DAY, Disp: 180 tablet, Rfl: 3    diclofenac sodium (Voltaren) 1 % gel, Apply 4.5  inches (4 g) topically 4 times a day., Disp: 100 g, Rfl: 3    fexofenadine (Allegra) 180 mg tablet, Take 1 tablet (180 mg) by mouth once daily., Disp: , Rfl:     fluticasone (Flonase) 50 mcg/actuation nasal spray, Administer 1 spray into each nostril once daily. Shake gently. Before first use, prime pump. After use, clean tip and replace cap., Disp: 16 g, Rfl: 11    hydrocortisone (hydrocortisone-aloe vera) 1 % cream, Apply 1 Application topically 2 times a day., Disp: , Rfl:     omeprazole (PriLOSEC) 20 mg DR capsule, Take 1 capsule (20 mg) by mouth once daily., Disp: 90 capsule, Rfl: 3    potassium chloride CR 20 mEq ER tablet, Take 2 tablets (40 mEq) by mouth 2 times a day., Disp: 180 tablet, Rfl: 3    semaglutide, weight loss, (Wegovy) 2.4 mg/0.75 mL pen injector, Inject 2.4 mg under the skin 1 (one) time per week for 4 doses., Disp: 3 mL, Rfl: 0    tirzepatide, weight loss, (Zepbound) 2.5 mg/0.5 mL injection, Inject 2.5 mg under the skin every 7 days., Disp: 4 each, Rfl: 0    tiZANidine (Zanaflex) 4 mg tablet, Take 1 tablet (4 mg) by mouth 2 times a day., Disp: 30 tablet, Rfl: 1    warfarin (Coumadin) 5 mg tablet, Take 1 tablet (5 mg) by mouth once daily at bedtime. 5mg PO daily, Disp: 90 tablet, Rfl: 3     Imaging:  No results found.     Labs reviewed:    Lab Results   Component Value Date    WBC 5.5 10/24/2023    HGB 13.0 10/24/2023    HCT 39.9 10/24/2023     10/24/2023    CHOL 267 (H) 02/13/2024    TRIG 104 02/13/2024    HDL 61.0 02/13/2024    ALT 11 02/13/2024    AST 17 02/13/2024     02/13/2024    K 3.9 02/13/2024     (H) 02/13/2024    CREATININE 1.00 02/13/2024    BUN 18 02/13/2024    CO2 24 02/13/2024    TSH 0.82 08/28/2023    INR 2.7 (A) 04/22/2024    HGBA1C 5.8 (H) 02/13/2024       Assessment/Plan   Problem List Items Addressed This Visit          Cardiac and Vasculature    Atrial fibrillation (Multi)     INR appropriate 2.7  Continue coumadin 5 mg   Recheck in one month           Relevant Orders    POCT INR manually resulted (Completed)    Hypertension       Musculoskeletal and Injuries    Lower back pain       Continue current medications as listed  Follow up in one month INR

## 2024-04-30 ENCOUNTER — APPOINTMENT (OUTPATIENT)
Dept: PRIMARY CARE | Facility: CLINIC | Age: 69
End: 2024-04-30
Payer: MEDICARE

## 2024-05-01 ENCOUNTER — HOSPITAL ENCOUNTER (OUTPATIENT)
Dept: RADIOLOGY | Facility: CLINIC | Age: 69
Discharge: HOME | End: 2024-05-01
Payer: MEDICARE

## 2024-05-01 DIAGNOSIS — Z12.31 ENCOUNTER FOR SCREENING MAMMOGRAM FOR BREAST CANCER: ICD-10-CM

## 2024-05-01 PROCEDURE — 77063 BREAST TOMOSYNTHESIS BI: CPT | Performed by: RADIOLOGY

## 2024-05-01 PROCEDURE — 77067 SCR MAMMO BI INCL CAD: CPT | Performed by: RADIOLOGY

## 2024-05-01 PROCEDURE — 77067 SCR MAMMO BI INCL CAD: CPT

## 2024-05-16 DIAGNOSIS — I48.91 ATRIAL FIBRILLATION, UNSPECIFIED TYPE (MULTI): ICD-10-CM

## 2024-05-16 DIAGNOSIS — I50.9 CONGESTIVE HEART FAILURE, UNSPECIFIED HF CHRONICITY, UNSPECIFIED HEART FAILURE TYPE (MULTI): ICD-10-CM

## 2024-06-12 ENCOUNTER — APPOINTMENT (OUTPATIENT)
Dept: PRIMARY CARE | Facility: CLINIC | Age: 69
End: 2024-06-12
Payer: MEDICARE

## 2024-06-13 DIAGNOSIS — I50.9 CONGESTIVE HEART FAILURE, UNSPECIFIED HF CHRONICITY, UNSPECIFIED HEART FAILURE TYPE (MULTI): Primary | ICD-10-CM

## 2024-06-19 ENCOUNTER — LAB (OUTPATIENT)
Dept: LAB | Facility: LAB | Age: 69
End: 2024-06-19
Payer: MEDICARE

## 2024-06-19 DIAGNOSIS — E55.9 VITAMIN D DEFICIENCY: ICD-10-CM

## 2024-06-19 DIAGNOSIS — I50.9 CONGESTIVE HEART FAILURE, UNSPECIFIED HF CHRONICITY, UNSPECIFIED HEART FAILURE TYPE (MULTI): ICD-10-CM

## 2024-06-19 DIAGNOSIS — I48.91 ATRIAL FIBRILLATION, UNSPECIFIED TYPE (MULTI): ICD-10-CM

## 2024-06-19 DIAGNOSIS — Z00.00 ENCOUNTER FOR SUBSEQUENT ANNUAL WELLNESS VISIT (AWV) IN MEDICARE PATIENT: ICD-10-CM

## 2024-06-19 LAB
25(OH)D3 SERPL-MCNC: 28 NG/ML (ref 31–100)
ALBUMIN SERPL-MCNC: 3.9 G/DL (ref 3.5–5)
ALP BLD-CCNC: 121 U/L (ref 35–125)
ALT SERPL-CCNC: 16 U/L (ref 5–40)
ANION GAP SERPL CALC-SCNC: 10 MMOL/L
AST SERPL-CCNC: 19 U/L (ref 5–40)
BILIRUB SERPL-MCNC: 0.4 MG/DL (ref 0.1–1.2)
BUN SERPL-MCNC: 16 MG/DL (ref 8–25)
CALCIUM SERPL-MCNC: 9.8 MG/DL (ref 8.5–10.4)
CHLORIDE SERPL-SCNC: 108 MMOL/L (ref 97–107)
CHOLEST SERPL-MCNC: 223 MG/DL (ref 133–200)
CHOLEST/HDLC SERPL: 3.3 {RATIO}
CO2 SERPL-SCNC: 24 MMOL/L (ref 24–31)
CREAT SERPL-MCNC: 1 MG/DL (ref 0.4–1.6)
EGFRCR SERPLBLD CKD-EPI 2021: 62 ML/MIN/1.73M*2
ERYTHROCYTE [DISTWIDTH] IN BLOOD BY AUTOMATED COUNT: 15.8 % (ref 11.5–14.5)
EST. AVERAGE GLUCOSE BLD GHB EST-MCNC: 105 MG/DL
GLUCOSE SERPL-MCNC: 84 MG/DL (ref 65–99)
HBA1C MFR BLD: 5.3 %
HCT VFR BLD AUTO: 43 % (ref 36–46)
HDLC SERPL-MCNC: 67 MG/DL
HGB BLD-MCNC: 14 G/DL (ref 12–16)
LDLC SERPL CALC-MCNC: 141 MG/DL (ref 65–130)
MCH RBC QN AUTO: 27.6 PG (ref 26–34)
MCHC RBC AUTO-ENTMCNC: 32.6 G/DL (ref 32–36)
MCV RBC AUTO: 85 FL (ref 80–100)
NRBC BLD-RTO: 0 /100 WBCS (ref 0–0)
PLATELET # BLD AUTO: 233 X10*3/UL (ref 150–450)
POTASSIUM SERPL-SCNC: 4.3 MMOL/L (ref 3.4–5.1)
PROT SERPL-MCNC: 6.7 G/DL (ref 5.9–7.9)
RBC # BLD AUTO: 5.08 X10*6/UL (ref 4–5.2)
SODIUM SERPL-SCNC: 142 MMOL/L (ref 133–145)
TRIGL SERPL-MCNC: 73 MG/DL (ref 40–150)
TSH SERPL DL<=0.05 MIU/L-ACNC: 0.79 MIU/L (ref 0.27–4.2)
WBC # BLD AUTO: 5.5 X10*3/UL (ref 4.4–11.3)

## 2024-06-19 PROCEDURE — 36415 COLL VENOUS BLD VENIPUNCTURE: CPT

## 2024-06-19 PROCEDURE — 82306 VITAMIN D 25 HYDROXY: CPT

## 2024-06-19 PROCEDURE — 80053 COMPREHEN METABOLIC PANEL: CPT

## 2024-06-19 PROCEDURE — 85027 COMPLETE CBC AUTOMATED: CPT

## 2024-06-19 PROCEDURE — 80061 LIPID PANEL: CPT

## 2024-06-19 PROCEDURE — 84443 ASSAY THYROID STIM HORMONE: CPT

## 2024-06-19 PROCEDURE — 83036 HEMOGLOBIN GLYCOSYLATED A1C: CPT

## 2024-06-21 ENCOUNTER — APPOINTMENT (OUTPATIENT)
Dept: PRIMARY CARE | Facility: CLINIC | Age: 69
End: 2024-06-21
Payer: MEDICARE

## 2024-06-21 VITALS
WEIGHT: 206 LBS | RESPIRATION RATE: 12 BRPM | SYSTOLIC BLOOD PRESSURE: 133 MMHG | BODY MASS INDEX: 34.28 KG/M2 | HEART RATE: 72 BPM | OXYGEN SATURATION: 94 % | DIASTOLIC BLOOD PRESSURE: 87 MMHG

## 2024-06-21 DIAGNOSIS — R12 HEARTBURN: ICD-10-CM

## 2024-06-21 DIAGNOSIS — E66.01 OBESITY, MORBID (MULTI): ICD-10-CM

## 2024-06-21 DIAGNOSIS — I48.91 ATRIAL FIBRILLATION, UNSPECIFIED TYPE (MULTI): ICD-10-CM

## 2024-06-21 DIAGNOSIS — J40 BRONCHITIS: ICD-10-CM

## 2024-06-21 DIAGNOSIS — E78.5 HYPERLIPIDEMIA, UNSPECIFIED HYPERLIPIDEMIA TYPE: ICD-10-CM

## 2024-06-21 DIAGNOSIS — E53.8 VITAMIN B12 DEFICIENCY: ICD-10-CM

## 2024-06-21 DIAGNOSIS — Z00.00 ENCOUNTER FOR ANNUAL WELLNESS VISIT (AWV) IN MEDICARE PATIENT: Primary | ICD-10-CM

## 2024-06-21 DIAGNOSIS — M17.0 PRIMARY OSTEOARTHRITIS OF BOTH KNEES: ICD-10-CM

## 2024-06-21 DIAGNOSIS — M54.50 LOW BACK PAIN, UNSPECIFIED BACK PAIN LATERALITY, UNSPECIFIED CHRONICITY, UNSPECIFIED WHETHER SCIATICA PRESENT: ICD-10-CM

## 2024-06-21 DIAGNOSIS — I15.9 SECONDARY HYPERTENSION: ICD-10-CM

## 2024-06-21 DIAGNOSIS — J30.9 ALLERGIC RHINITIS, UNSPECIFIED SEASONALITY, UNSPECIFIED TRIGGER: ICD-10-CM

## 2024-06-21 DIAGNOSIS — E55.9 VITAMIN D DEFICIENCY: ICD-10-CM

## 2024-06-21 LAB
POC APPEARANCE, URINE: CLEAR
POC BILIRUBIN, URINE: NEGATIVE
POC BLOOD, URINE: NEGATIVE
POC COLOR, URINE: YELLOW
POC GLUCOSE, URINE: NEGATIVE MG/DL
POC INR: 2.7 (ref 0.9–1.1)
POC KETONES, URINE: NEGATIVE MG/DL
POC LEUKOCYTES, URINE: NEGATIVE
POC NITRITE,URINE: NEGATIVE
POC PH, URINE: 5 PH
POC PROTEIN, URINE: NEGATIVE MG/DL
POC PROTHROMBIN TIME: 34.7 (ref 9.3–12.5)
POC SPECIFIC GRAVITY, URINE: 1.02
POC UROBILINOGEN, URINE: 0.2 EU/DL

## 2024-06-21 RX ORDER — CLONIDINE HYDROCHLORIDE 0.2 MG/1
0.2 TABLET ORAL 2 TIMES DAILY
Qty: 180 TABLET | Refills: 3 | Status: SHIPPED | OUTPATIENT
Start: 2024-06-21

## 2024-06-21 RX ORDER — AMLODIPINE BESYLATE 10 MG/1
10 TABLET ORAL DAILY
Qty: 90 TABLET | Refills: 3 | Status: SHIPPED | OUTPATIENT
Start: 2024-06-21

## 2024-06-21 RX ORDER — CYANOCOBALAMIN 1000 UG/ML
1000 INJECTION, SOLUTION INTRAMUSCULAR; SUBCUTANEOUS ONCE
Status: COMPLETED | OUTPATIENT
Start: 2024-06-21 | End: 2024-06-21

## 2024-06-21 RX ORDER — FLAXSEED OIL 1000 MG
1 CAPSULE ORAL 3 TIMES DAILY
Qty: 90 CAPSULE | Refills: 3 | Status: SHIPPED | OUTPATIENT
Start: 2024-06-21

## 2024-06-21 RX ORDER — TRIAMCINOLONE ACETONIDE 40 MG/ML
40 INJECTION, SUSPENSION INTRA-ARTICULAR; INTRAMUSCULAR ONCE
Status: COMPLETED | OUTPATIENT
Start: 2024-06-21 | End: 2024-06-21

## 2024-06-21 RX ORDER — ATORVASTATIN CALCIUM 10 MG/1
20 TABLET, FILM COATED ORAL DAILY
Qty: 90 TABLET | Refills: 2 | Status: SHIPPED | OUTPATIENT
Start: 2024-06-21

## 2024-06-21 RX ORDER — TIZANIDINE 4 MG/1
4 TABLET ORAL 2 TIMES DAILY
Qty: 30 TABLET | Refills: 1 | Status: SHIPPED | OUTPATIENT
Start: 2024-06-21

## 2024-06-21 RX ORDER — WARFARIN SODIUM 5 MG/1
5 TABLET ORAL NIGHTLY
Qty: 90 TABLET | Refills: 3 | Status: SHIPPED | OUTPATIENT
Start: 2024-06-21

## 2024-06-21 ASSESSMENT — PATIENT HEALTH QUESTIONNAIRE - PHQ9
2. FEELING DOWN, DEPRESSED OR HOPELESS: NOT AT ALL
SUM OF ALL RESPONSES TO PHQ9 QUESTIONS 1 AND 2: 0
1. LITTLE INTEREST OR PLEASURE IN DOING THINGS: NOT AT ALL

## 2024-06-21 ASSESSMENT — ENCOUNTER SYMPTOMS
DEPRESSION: 0
OCCASIONAL FEELINGS OF UNSTEADINESS: 0
LOSS OF SENSATION IN FEET: 0

## 2024-06-21 ASSESSMENT — ACTIVITIES OF DAILY LIVING (ADL)
TAKING_MEDICATION: INDEPENDENT
DOING_HOUSEWORK: INDEPENDENT
DRESSING: INDEPENDENT
GROCERY_SHOPPING: INDEPENDENT
MANAGING_FINANCES: INDEPENDENT
BATHING: INDEPENDENT

## 2024-06-21 NOTE — ASSESSMENT & PLAN NOTE
LDL is still elevated at I will increase Lipitor to 20 mg daily from 10 lipid in 3 months  Increase Omega3/Flaxseed 1200 mcg TID w/every meal

## 2024-06-21 NOTE — PROGRESS NOTES
ANNUAL WELLNESS VISIT    Subjective :  Chief Complaint: Halima Mckeon is an 68 y.o. female here for an annual wellness visit and general medical care and f/u.     HPI:  HPI  Patient comes to the clinic today to review and discuss lab results.  Pt reports bilateral foot pain     Objective   /87   Pulse 72   Resp 12   Wt 93.4 kg (206 lb)   SpO2 94%   BMI 34.28 kg/m²     Physical Exam  Vitals reviewed.   Constitutional:       General: She is not in acute distress.     Appearance: Normal appearance. She is obese. She is not ill-appearing or toxic-appearing.   HENT:      Head: Normocephalic and atraumatic.   Cardiovascular:      Rate and Rhythm: Normal rate and regular rhythm.      Pulses: Normal pulses.      Heart sounds: Normal heart sounds.   Pulmonary:      Effort: Pulmonary effort is normal. No respiratory distress.      Breath sounds: Normal breath sounds.   Abdominal:      Palpations: Abdomen is soft.   Musculoskeletal:      Cervical back: Normal range of motion. No rigidity.   Skin:     General: Skin is warm and dry.   Neurological:      General: No focal deficit present.      Mental Status: She is oriented to person, place, and time.   Psychiatric:         Mood and Affect: Mood normal.         Behavior: Behavior normal.         Thought Content: Thought content normal.         Judgment: Judgment normal.         Imaging:  No results found.     Labs reviewed:    Lab Results   Component Value Date    WBC 5.5 06/19/2024    HGB 14.0 06/19/2024    HCT 43.0 06/19/2024     06/19/2024    CHOL 223 (H) 06/19/2024    TRIG 73 06/19/2024    HDL 67.0 06/19/2024    ALT 16 06/19/2024    AST 19 06/19/2024     06/19/2024    K 4.3 06/19/2024     (H) 06/19/2024    CREATININE 1.00 06/19/2024    BUN 16 06/19/2024    CO2 24 06/19/2024    TSH 0.79 06/19/2024    INR 2.7 (A) 06/21/2024    HGBA1C 5.3 06/19/2024       Past Medical, Surgical, and Family History reviewed and updated in chart.    I have reviewed  and reconciled the medication list with the patient today.   Current Outpatient Medications:     albuterol (Ventolin HFA) 90 mcg/actuation inhaler, Inhale 2 puffs every 4 hours if needed for wheezing or shortness of breath., Disp: 8 g, Rfl: 5    albuterol 90 mcg/actuation inhaler, Inhale 2 puffs 3 times a day. EVERY FOUR TO SIX HOURS AS NEEDED, Disp: 18 g, Rfl: 1    amLODIPine (Norvasc) 10 mg tablet, Take 1 tablet (10 mg) by mouth once daily., Disp: 90 tablet, Rfl: 3    aspirin 81 mg EC tablet, Take 1 tablet (81 mg) by mouth once daily., Disp: , Rfl:     atorvastatin (Lipitor) 10 mg tablet, Take 1 tablet (10 mg) by mouth once daily., Disp: , Rfl:     cloNIDine (Catapres) 0.2 mg tablet, TAKE ONE TABLET BY MOUTH TWICE A DAY, Disp: 180 tablet, Rfl: 3    diclofenac sodium (Voltaren) 1 % gel, Apply 4.5 inches (4 g) topically 4 times a day., Disp: 100 g, Rfl: 3    fexofenadine (Allegra) 180 mg tablet, Take 1 tablet (180 mg) by mouth once daily., Disp: , Rfl:     fluticasone (Flonase) 50 mcg/actuation nasal spray, Administer 1 spray into each nostril once daily. Shake gently. Before first use, prime pump. After use, clean tip and replace cap., Disp: 16 g, Rfl: 11    omeprazole (PriLOSEC) 20 mg DR capsule, Take 1 capsule (20 mg) by mouth once daily., Disp: 90 capsule, Rfl: 3    potassium chloride CR 20 mEq ER tablet, Take 2 tablets (40 mEq) by mouth 2 times a day., Disp: 180 tablet, Rfl: 3    tiZANidine (Zanaflex) 4 mg tablet, Take 1 tablet (4 mg) by mouth 2 times a day., Disp: 30 tablet, Rfl: 1    warfarin (Coumadin) 5 mg tablet, Take 1 tablet (5 mg) by mouth once daily at bedtime. 5mg PO daily, Disp: 90 tablet, Rfl: 3    hydrocortisone (hydrocortisone-aloe vera) 1 % cream, Apply 1 Application topically 2 times a day., Disp: , Rfl:   No current facility-administered medications for this visit.     List of current healthcare providers:  Patient Care Team:  Elma Ramos MD as PCP - General  Elma Ramos MD as PCP -  [employer] PCP  Elma Ramos MD as Primary Care Provider     HRA:  Over the past 2 weeks, how often have you been bothered by any of the following problems?  Little interest or pleasure in doing things: Not at all  Feeling down, depressed, or hopeless: Not at all  Patient Health Questionnaire-2 Score: 0    Steadi Fall Risk  One or more falls in the last year? Yes  How many Times? 1  Was the patient injured in the fall? No  Has trouble stepping onto curb? No  Advised to use a cane or walker to get around safely? No  Often has to rush to toilet? No  Feels unsteady when walking? No  Has lost some feeling in feet? No  Often feels sad or depressed? No  Steadies self on furniture while walking at home? No  Takes medicine that makes them feel lightheaded or more tired than usual? No  Worried about Falling? No  Takes medicine to sleep or improve mood? No  Needs to push with hands when rising from a chair? No            Falls Home Safety Risk Factors  Home Safety Risk Factors: None    Functional Ability/Level of Safety  Cognitive Impairment Observed: No cognitive impairment observed    Patient Self Assessment of Health Status  Patient Self Assessment: Good    Nutrition and Exercise  Current Diet: Well Balanced Diet  Adequate Fluid Intake: Yes  Caffeine: Yes  Exercise Frequency: No Exercise    ADL Screening  Hearing - Right Ear: Functional  Hearing - Left Ear: Functional  Bathing: Independent  Dressing: Independent  Walks in Home: Independent    IADL's  Managing Finances: Independent  Grocery Shopping: Independent  Taking Medication: Independent  Doing Housework: Independent    Assessment/Plan :  Problem List Items Addressed This Visit       Allergic rhinitis    Relevant Medications    triamcinolone acetonide (Kenalog-40) injection 40 mg (Completed)    Atrial fibrillation (Multi)     INR appropriate 2.7  Continue coumadin 5 mg   Recheck in one month          Relevant Orders    POCT INR manually resulted  (Completed)    Bronchitis     Albuterol as needed         Heartburn     Continue Prilosec         Hyperlipidemia     LDL is still elevated at I will increase Lipitor to 20 mg daily from 10 lipid in 3 months  Increase Omega3/Flaxseed 1200 mcg TID w/every meal         Lower back pain    Obesity, morbid (Multi)     Insurance didn't approve Wegovy/Zepbound         Osteoarthritis of knee     Continue Voltaren gel  Tizanidine   Kenalog shot given in the office         Hypertension     Continue amlodipine as prescribed  Clonidine         Relevant Orders    ECG 12 lead (Clinic Performed)    Vitamin B12 deficiency     Received B12 shot today.         Relevant Medications    cyanocobalamin (Vitamin B-12) injection 1,000 mcg (Completed)    Vitamin D deficiency     Vit D 28  Vit D3 1000iu daily ordered          Other Visit Diagnoses       Encounter for annual wellness visit (AWV) in Medicare patient    -  Primary    Relevant Orders    POCT UA (nonautomated) manually resulted (Completed)    POCT INR manually resulted (Completed)    ECG 12 lead (Clinic Performed)          The following health maintenance schedule was reviewed with the patient and provided in printed form in the after visit summary:  Health Maintenance   Topic Date Due    Medicare Annual Wellness Visit (AWV)  07/19/2024    Diabetes: Hemoglobin A1C  09/19/2024    Mammogram  05/01/2025    Creatinine Level  06/19/2025    Potassium Level  06/19/2025    TSH Level  06/19/2025    Lipid Panel  06/19/2025    Influenza Vaccine  Completed    Pneumococcal Vaccine: 65+ Years  Completed    HIB Vaccines  Aged Out    Hepatitis B Vaccines  Aged Out    IPV Vaccines  Aged Out    Hepatitis A Vaccines  Aged Out    Meningococcal Vaccine  Aged Out    Rotavirus Vaccines  Aged Out    HPV Vaccines  Aged Out    EGD  Discontinued    Echocardiogram  Discontinued    RSV Pregnant patients and/or  patients aged 60+ years  Discontinued    DTaP/Tdap/Td Vaccines  Discontinued    Diabetes: Foot Exam   Discontinued    Diabetes: Retinopathy Screening  Discontinued    Diabetes: Urine Protein Screening  Discontinued    Zoster Vaccines  Discontinued    Hepatitis C Screening  Discontinued    Bone Density Scan  Discontinued    Colorectal Cancer Screening  Discontinued    COVID-19 Vaccine  Discontinued       Advance Care Planning   Not yet established             Orders Placed This Encounter   Procedures    POCT UA (nonautomated) manually resulted     Order Specific Question:   Release result to MyChart     Answer:   Immediate [1]    POCT INR manually resulted     Order Specific Question:   Release result to MyChart     Answer:   Immediate [1]    ECG 12 lead (Clinic Performed)       Continue current medications as listed  Follow up in 1m

## 2024-07-15 ENCOUNTER — APPOINTMENT (OUTPATIENT)
Dept: PRIMARY CARE | Facility: CLINIC | Age: 69
End: 2024-07-15
Payer: MEDICARE

## 2024-08-23 ENCOUNTER — OFFICE VISIT (OUTPATIENT)
Dept: PRIMARY CARE | Facility: CLINIC | Age: 69
End: 2024-08-23
Payer: MEDICARE

## 2024-08-23 DIAGNOSIS — I48.91 ATRIAL FIBRILLATION, UNSPECIFIED TYPE (MULTI): Primary | ICD-10-CM

## 2024-08-23 DIAGNOSIS — J40 BRONCHITIS: Primary | ICD-10-CM

## 2024-08-23 DIAGNOSIS — B96.89 SINUSITIS, BACTERIAL: ICD-10-CM

## 2024-08-23 DIAGNOSIS — J32.9 SINUSITIS, BACTERIAL: ICD-10-CM

## 2024-08-23 DIAGNOSIS — I50.9 CONGESTIVE HEART FAILURE, UNSPECIFIED HF CHRONICITY, UNSPECIFIED HEART FAILURE TYPE (MULTI): ICD-10-CM

## 2024-08-23 DIAGNOSIS — J20.9 ACUTE BRONCHITIS WITH BRONCHOSPASM: ICD-10-CM

## 2024-08-23 LAB — POCT PROTHROMBIN TIME: 2.3 SECONDS

## 2024-08-23 PROCEDURE — 3044F HG A1C LEVEL LT 7.0%: CPT | Performed by: INTERNAL MEDICINE

## 2024-08-23 PROCEDURE — 3050F LDL-C >= 130 MG/DL: CPT | Performed by: INTERNAL MEDICINE

## 2024-08-23 PROCEDURE — 99214 OFFICE O/P EST MOD 30 MIN: CPT | Performed by: INTERNAL MEDICINE

## 2024-08-23 PROCEDURE — 85610 PROTHROMBIN TIME: CPT | Performed by: INTERNAL MEDICINE

## 2024-08-23 RX ORDER — AMOXICILLIN AND CLAVULANATE POTASSIUM 875; 125 MG/1; MG/1
875 TABLET, FILM COATED ORAL 2 TIMES DAILY
Qty: 20 TABLET | Refills: 0 | Status: SHIPPED | OUTPATIENT
Start: 2024-08-23 | End: 2024-09-02

## 2024-08-23 RX ORDER — AMOXICILLIN AND CLAVULANATE POTASSIUM 875; 125 MG/1; MG/1
875 TABLET, FILM COATED ORAL 2 TIMES DAILY
Qty: 20 TABLET | Refills: 0 | Status: CANCELLED | OUTPATIENT
Start: 2024-08-23 | End: 2024-09-02

## 2024-08-23 ASSESSMENT — ENCOUNTER SYMPTOMS: SINUS COMPLAINT: 1

## 2024-08-23 NOTE — PROGRESS NOTES
Subjective   Chief complaint: Halima Mckeon is a 68 y.o. female who presents for Subtherapeutic INR (INR ) and Sinus Problem.    HPI:  Follow-up general medical care  Postnasal drainage cough productive yellow sputum's short of breath headache etc.    Sinus Problem        Objective   There were no vitals taken for this visit.  Physical Exam  Vitals reviewed.   Constitutional:       Appearance: Normal appearance.   HENT:      Head: Normocephalic and atraumatic.      Comments: Sinuses tender  Cardiovascular:      Rate and Rhythm: Normal rate and regular rhythm.   Pulmonary:      Effort: Pulmonary effort is normal.      Breath sounds: Normal breath sounds.   Abdominal:      General: Bowel sounds are normal.      Palpations: Abdomen is soft.   Musculoskeletal:      Cervical back: Neck supple.   Skin:     General: Skin is warm and dry.   Neurological:      General: No focal deficit present.      Mental Status: She is alert.   Psychiatric:         Mood and Affect: Mood normal.         Behavior: Behavior is cooperative.         I have reviewed and reconciled the medication list with the patient today.   Current Outpatient Medications:     albuterol (Ventolin HFA) 90 mcg/actuation inhaler, Inhale 2 puffs every 4 hours if needed for wheezing or shortness of breath., Disp: 8 g, Rfl: 5    albuterol 90 mcg/actuation inhaler, Inhale 2 puffs 3 times a day. EVERY FOUR TO SIX HOURS AS NEEDED, Disp: 18 g, Rfl: 1    amLODIPine (Norvasc) 10 mg tablet, Take 1 tablet (10 mg) by mouth once daily., Disp: 90 tablet, Rfl: 3    aspirin 81 mg EC tablet, Take 1 tablet (81 mg) by mouth once daily., Disp: , Rfl:     atorvastatin (Lipitor) 10 mg tablet, Take 2 tablets (20 mg) by mouth once daily., Disp: 90 tablet, Rfl: 2    cloNIDine (Catapres) 0.2 mg tablet, Take 1 tablet (0.2 mg) by mouth 2 times a day., Disp: 180 tablet, Rfl: 3    diclofenac sodium (Voltaren) 1 % gel, Apply 4.5 inches (4 g) topically 4 times a day., Disp: 100 g, Rfl: 3     fexofenadine (Allegra) 180 mg tablet, Take 1 tablet (180 mg) by mouth once daily., Disp: , Rfl:     flaxseed oiL 1,000 mg capsule, Take 1 capsule (1,000 mg) by mouth 3 times a day., Disp: 90 capsule, Rfl: 3    fluticasone (Flonase) 50 mcg/actuation nasal spray, Administer 1 spray into each nostril once daily. Shake gently. Before first use, prime pump. After use, clean tip and replace cap., Disp: 16 g, Rfl: 11    hydrocortisone (hydrocortisone-aloe vera) 1 % cream, Apply 1 Application topically 2 times a day., Disp: , Rfl:     omeprazole (PriLOSEC) 20 mg DR capsule, Take 1 capsule (20 mg) by mouth once daily., Disp: 90 capsule, Rfl: 3    potassium chloride CR 20 mEq ER tablet, Take 2 tablets (40 mEq) by mouth 2 times a day., Disp: 180 tablet, Rfl: 3    tiZANidine (Zanaflex) 4 mg tablet, Take 1 tablet (4 mg) by mouth 2 times a day., Disp: 30 tablet, Rfl: 1    warfarin (Coumadin) 5 mg tablet, Take 1 tablet (5 mg) by mouth once daily at bedtime. 5mg PO daily, Disp: 90 tablet, Rfl: 3     Imaging:  No results found.     Labs reviewed:    Lab Results   Component Value Date    WBC 5.5 06/19/2024    HGB 14.0 06/19/2024    HCT 43.0 06/19/2024     06/19/2024    CHOL 223 (H) 06/19/2024    TRIG 73 06/19/2024    HDL 67.0 06/19/2024    ALT 16 06/19/2024    AST 19 06/19/2024     06/19/2024    K 4.3 06/19/2024     (H) 06/19/2024    CREATININE 1.00 06/19/2024    BUN 16 06/19/2024    CO2 24 06/19/2024    TSH 0.79 06/19/2024    INR 2.7 (A) 06/21/2024    HGBA1C 5.3 06/19/2024       Assessment/Plan   Problem List Items Addressed This Visit       Acute bronchitis with bronchospasm     Augmentin  Phenergan for the cough  Albuterol inhaler         Atrial fibrillation (Multi) - Primary     INR therapeutic continue same Coumadin dosage  INR to be checked in a week because of the Augmentin         Relevant Orders    POCT PROTIME-INR, FINGERSTICK    CHF (congestive heart failure) (Multi)     Follow low-salt diet  Continue  Lasix  Fluid restriction  Elevate the         Sinusitis, bacterial     Augmentin  Mucinex  Flonase            Continue current medications as listed  Follow up in follow-up 1 week for INR

## 2024-08-23 NOTE — ASSESSMENT & PLAN NOTE
INR therapeutic continue same Coumadin dosage  INR to be checked in a week because of the Augmentin

## 2024-08-28 DIAGNOSIS — I48.91 ATRIAL FIBRILLATION, UNSPECIFIED TYPE (MULTI): ICD-10-CM

## 2024-09-01 RX ORDER — WARFARIN SODIUM 5 MG/1
5 TABLET ORAL NIGHTLY
Qty: 90 TABLET | Refills: 3 | Status: SHIPPED | OUTPATIENT
Start: 2024-09-01

## 2024-10-14 ENCOUNTER — APPOINTMENT (OUTPATIENT)
Dept: PRIMARY CARE | Facility: CLINIC | Age: 69
End: 2024-10-14
Payer: MEDICARE

## 2024-10-31 ENCOUNTER — OFFICE VISIT (OUTPATIENT)
Dept: PRIMARY CARE | Facility: CLINIC | Age: 69
End: 2024-10-31
Payer: MEDICARE

## 2024-10-31 VITALS
HEART RATE: 72 BPM | RESPIRATION RATE: 12 BRPM | OXYGEN SATURATION: 95 % | DIASTOLIC BLOOD PRESSURE: 75 MMHG | SYSTOLIC BLOOD PRESSURE: 119 MMHG

## 2024-10-31 DIAGNOSIS — I50.9 CONGESTIVE HEART FAILURE, UNSPECIFIED HF CHRONICITY, UNSPECIFIED HEART FAILURE TYPE: ICD-10-CM

## 2024-10-31 DIAGNOSIS — I48.91 ATRIAL FIBRILLATION, UNSPECIFIED TYPE (MULTI): ICD-10-CM

## 2024-10-31 DIAGNOSIS — E78.5 HYPERLIPIDEMIA, UNSPECIFIED HYPERLIPIDEMIA TYPE: ICD-10-CM

## 2024-10-31 DIAGNOSIS — E53.8 VITAMIN B12 DEFICIENCY: ICD-10-CM

## 2024-10-31 DIAGNOSIS — Z23 NEEDS FLU SHOT: ICD-10-CM

## 2024-10-31 DIAGNOSIS — I48.91 ATRIAL FIBRILLATION, UNSPECIFIED TYPE (MULTI): Primary | ICD-10-CM

## 2024-10-31 DIAGNOSIS — I82.409 ACUTE EMBOLISM AND THROMBOSIS OF DEEP VEIN OF LOWER EXTREMITY, UNSPECIFIED LATERALITY (MULTI): ICD-10-CM

## 2024-10-31 DIAGNOSIS — N18.32 STAGE 3B CHRONIC KIDNEY DISEASE (MULTI): ICD-10-CM

## 2024-10-31 DIAGNOSIS — J01.90 ACUTE SINUSITIS, RECURRENCE NOT SPECIFIED, UNSPECIFIED LOCATION: ICD-10-CM

## 2024-10-31 LAB — POC INR: 2.2 (ref 0.9–1.1)

## 2024-10-31 RX ORDER — TRIAMCINOLONE ACETONIDE 40 MG/ML
40 INJECTION, SUSPENSION INTRA-ARTICULAR; INTRAMUSCULAR ONCE
Status: SHIPPED | OUTPATIENT
Start: 2024-10-31

## 2024-10-31 RX ORDER — CYANOCOBALAMIN 1000 UG/ML
1000 INJECTION, SOLUTION INTRAMUSCULAR; SUBCUTANEOUS ONCE
Status: COMPLETED | OUTPATIENT
Start: 2024-10-31 | End: 2024-10-31

## 2024-11-09 ENCOUNTER — APPOINTMENT (OUTPATIENT)
Dept: RADIOLOGY | Facility: HOSPITAL | Age: 69
End: 2024-11-09
Payer: MEDICARE

## 2024-11-09 ENCOUNTER — HOSPITAL ENCOUNTER (EMERGENCY)
Facility: HOSPITAL | Age: 69
Discharge: HOME | End: 2024-11-09
Payer: MEDICARE

## 2024-11-09 VITALS
TEMPERATURE: 96.8 F | SYSTOLIC BLOOD PRESSURE: 134 MMHG | OXYGEN SATURATION: 100 % | WEIGHT: 205 LBS | BODY MASS INDEX: 34.16 KG/M2 | RESPIRATION RATE: 16 BRPM | HEART RATE: 78 BPM | DIASTOLIC BLOOD PRESSURE: 95 MMHG | HEIGHT: 65 IN
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
## 2024-11-09 DIAGNOSIS — S72.115A CLOSED NONDISPLACED FRACTURE OF GREATER TROCHANTER OF LEFT FEMUR, INITIAL ENCOUNTER: ICD-10-CM

## 2024-11-09 DIAGNOSIS — M25.561 ACUTE PAIN OF RIGHT KNEE: ICD-10-CM

## 2024-11-09 PROCEDURE — 73700 CT LOWER EXTREMITY W/O DYE: CPT | Mod: LEFT SIDE | Performed by: RADIOLOGY

## 2024-11-09 PROCEDURE — 73502 X-RAY EXAM HIP UNI 2-3 VIEWS: CPT | Mod: LEFT SIDE | Performed by: RADIOLOGY

## 2024-11-09 PROCEDURE — 73700 CT LOWER EXTREMITY W/O DYE: CPT | Mod: LT

## 2024-11-09 PROCEDURE — 73564 X-RAY EXAM KNEE 4 OR MORE: CPT | Mod: RT

## 2024-11-09 PROCEDURE — 73110 X-RAY EXAM OF WRIST: CPT | Mod: LT

## 2024-11-09 PROCEDURE — 73552 X-RAY EXAM OF FEMUR 2/>: CPT | Mod: LEFT SIDE | Performed by: RADIOLOGY

## 2024-11-09 PROCEDURE — 73502 X-RAY EXAM HIP UNI 2-3 VIEWS: CPT | Mod: LT

## 2024-11-09 PROCEDURE — 73564 X-RAY EXAM KNEE 4 OR MORE: CPT | Mod: RIGHT SIDE | Performed by: RADIOLOGY

## 2024-11-09 PROCEDURE — 73110 X-RAY EXAM OF WRIST: CPT | Mod: LEFT SIDE | Performed by: RADIOLOGY

## 2024-11-09 PROCEDURE — 99284 EMERGENCY DEPT VISIT MOD MDM: CPT | Mod: 25

## 2024-11-09 PROCEDURE — 2500000001 HC RX 250 WO HCPCS SELF ADMINISTERED DRUGS (ALT 637 FOR MEDICARE OP)

## 2024-11-09 PROCEDURE — 73552 X-RAY EXAM OF FEMUR 2/>: CPT | Mod: LT

## 2024-11-09 RX ORDER — ACETAMINOPHEN 325 MG/1
975 TABLET ORAL ONCE
Status: COMPLETED | OUTPATIENT
Start: 2024-11-09 | End: 2024-11-09

## 2024-11-09 RX ADMIN — ACETAMINOPHEN 975 MG: 325 TABLET ORAL at 11:12

## 2024-11-09 ASSESSMENT — LIFESTYLE VARIABLES
EVER HAD A DRINK FIRST THING IN THE MORNING TO STEADY YOUR NERVES TO GET RID OF A HANGOVER: NO
EVER FELT BAD OR GUILTY ABOUT YOUR DRINKING: NO
TOTAL SCORE: 0
HAVE YOU EVER FELT YOU SHOULD CUT DOWN ON YOUR DRINKING: NO
HAVE PEOPLE ANNOYED YOU BY CRITICIZING YOUR DRINKING: NO

## 2024-11-09 ASSESSMENT — COLUMBIA-SUICIDE SEVERITY RATING SCALE - C-SSRS
6. HAVE YOU EVER DONE ANYTHING, STARTED TO DO ANYTHING, OR PREPARED TO DO ANYTHING TO END YOUR LIFE?: NO
1. IN THE PAST MONTH, HAVE YOU WISHED YOU WERE DEAD OR WISHED YOU COULD GO TO SLEEP AND NOT WAKE UP?: NO
2. HAVE YOU ACTUALLY HAD ANY THOUGHTS OF KILLING YOURSELF?: NO

## 2024-11-09 ASSESSMENT — PAIN SCALES - GENERAL
PAINLEVEL_OUTOF10: 8
PAINLEVEL_OUTOF10: 8

## 2024-11-09 ASSESSMENT — PAIN - FUNCTIONAL ASSESSMENT: PAIN_FUNCTIONAL_ASSESSMENT: 0-10

## 2024-11-09 NOTE — DISCHARGE INSTRUCTIONS
Continue with Tylenol and ibuprofen and weightbearing as tolerated for your mild left hip fracture.  Follow-up with the orthopedic surgeon for further evaluation of symptoms.    It is important to remember that your care does not end here and you must continue to monitor your condition closely. Please return to the emergency department for any worsening or concerning signs or symptoms as directed by our conversations and the discharge instructions. If you do not have a doctor please contact the referral number on your discharge instructions. Please contact any physician specialists provided in your discharge notes as it is very important to follow up with them regarding your condition. If you are unable to reach the physicians provided, please come back to the Emergency Department at any time.

## 2024-11-09 NOTE — ED PROVIDER NOTES
HPI   Chief Complaint   Patient presents with    Fall       HPI  Patient is a 69-year-old female presenting for evaluation of pain after a fall that occurred yesterday.  Patient states that she was walking in [place] and did trip and fell onto her left side.  She states that the grocery store workers were able to help her up and she was able to ambulate after this but states that today she woke up and had some pain in her right knee and was having to use a cane to ambulate due to the pain and also a bruise on her left thigh thus she presented to ER for further evaluation.  She did not hit her head or lose consciousness.  She states she does take Coumadin for blood thinning medication.  She does not endorse any head or neck pain.  She does state that she also has minor bruising to her left wrist.  Denies chest pain, shortness of breath, dizziness or lightheadedness, headaches or neck pain.      Patient History   Past Medical History:   Diagnosis Date    Encounter for general adult medical examination without abnormal findings 10/11/2018    Encounter for annual health examination    Encounter for general adult medical examination without abnormal findings     Encounter for annual health examination    Encounter for immunization 10/15/2018    Need for shingles vaccine    Personal history of other diseases of the circulatory system 05/25/2014    History of hypertension    Personal history of other diseases of the circulatory system 01/29/2018    History of atrial fibrillation    Personal history of other diseases of the circulatory system 10/23/2015    History of atrial fibrillation    Personal history of other specified conditions 04/05/2013    History of heartburn    Unspecified tear of unspecified meniscus, current injury, unspecified knee, initial encounter 05/01/2013    Acute meniscal tear of knee     Past Surgical History:   Procedure Laterality Date    HERNIA REPAIR  02/13/2014    Hernia Repair    KNEE SURGERY   2014    Knee Surgery    OTHER SURGICAL HISTORY  2019    Inguinal hernia repair    OTHER SURGICAL HISTORY  2019     section    OTHER SURGICAL HISTORY  2019    Cervical cerclage placement    OTHER SURGICAL HISTORY  2019    Knee replacement     Family History   Problem Relation Name Age of Onset    Other (CARDIAC DISORDER) Father      Hypertension Father      Cancer Father      Hypertension Brother      Cancer Brother      Prostate cancer Brother      Other (CERVICAL DYSPLAGIA) Daughter      Diabetes Daughter      Ovarian cancer Daughter  32    Lung cancer Maternal Grandmother      Lung cancer Father's Brother      Hypertension Cousin      Lung cancer Cousin      Prostate cancer Cousin      Hypertension Mother's Sister AUNT     Breast cancer Mother's Sister AUNT 60     Social History     Tobacco Use    Smoking status: Never    Smokeless tobacco: Never   Substance Use Topics    Alcohol use: Never    Drug use: Never       Physical Exam   ED Triage Vitals [24 1046]   Temperature Heart Rate Respirations BP   36 °C (96.8 °F) 78 16 (!) 134/95      Pulse Ox Temp Source Heart Rate Source Patient Position   100 % Temporal Monitor --      BP Location FiO2 (%)     -- --       Physical Exam  Vitals and nursing note reviewed.   Constitutional:       General: She is not in acute distress.     Appearance: She is well-developed.      Comments: Well-appearing and ambulatory with a cane during the visit   HENT:      Head: Normocephalic and atraumatic.   Eyes:      Conjunctiva/sclera: Conjunctivae normal.   Cardiovascular:      Rate and Rhythm: Normal rate and regular rhythm.      Heart sounds: No murmur heard.  Pulmonary:      Effort: Pulmonary effort is normal. No respiratory distress.      Breath sounds: Normal breath sounds.   Abdominal:      Palpations: Abdomen is soft.      Tenderness: There is no abdominal tenderness.   Musculoskeletal:      Cervical back: Neck supple.      Comments: No  palpable injuries or deformities of the right knee or left hip, there is a small contusion to the superior and lateral portion of the left thigh.  Peripheral pulses are +2 bilaterally   Skin:     General: Skin is warm and dry.      Capillary Refill: Capillary refill takes less than 2 seconds.   Neurological:      General: No focal deficit present.      Mental Status: She is alert and oriented to person, place, and time.   Psychiatric:         Mood and Affect: Mood normal.           ED Course & MDM   ED Course as of 11/09/24 1407   Sat Nov 09, 2024   1324 I spoke with the orthopedic surgeon on-call Dr. Borrego.  He states that greater trochanter fractures are typically treated weightbearing as tolerated.  Patient can follow-up with him in the office. [JJ]      ED Course User Index  [JJ] Helen Sheppard PA-C         Diagnoses as of 11/09/24 1407   Fall, initial encounter   Closed nondisplaced fracture of greater trochanter of left femur, initial encounter   Acute pain of right knee                 No data recorded     [place] Coma Scale Score: 15 (11/09/24 1050 : Marina Webber RN)                           Medical Decision Making  Parts of this chart have been completed using voice recognition software. Please excuse any errors of transcription.  My thought process and reason for plan has been formulated from the time that I saw the patient until the time of disposition and is not specific to one specific moment during their visit and furthermore my MDM encompasses this entire chart and not only this text box.      HPI: Detailed above.    Exam: A medically appropriate exam performed, outlined above, given the known history and presentation.    History obtained from: Patient    Medications given during visit:  Medications   acetaminophen (Tylenol) tablet 975 mg (975 mg oral Given 11/9/24 1112)        Diagnostic/tests  Labs Reviewed - No data to display   CT hip left wo IV contrast   Final Result   1. Subtle nondisplaced  fracture of the periphery of the left greater   trochanter.   2. Degenerative change as above.        MACRO:   none        Signed by: Yon Wheeler 11/9/2024 1:02 PM   Dictation workstation:   [id number]      XR wrist left 3+ views   Final Result   No osseous injury is evident.        MACRO:   None        Signed by: Yon Wheeler 11/9/2024 12:05 PM   Dictation workstation:   [id number]      XR hip left with pelvis when performed 2 or 3 views   Final Result   Age indeterminate fragmentation of the greater trochanter. Knowledge   of any point tenderness to this region may be helpful. CT may be   helpful for further assessment.        Signed by: Yon Wheeler 11/9/2024 12:03 PM   Dictation workstation:   [id number]      XR femur left 2+ views   Final Result   Age indeterminate fragmentation of the greater trochanter. Knowledge   of any point tenderness to this region may be helpful. CT may be   helpful for further assessment.        Signed by: Yon Wheeler 11/9/2024 12:03 PM   Dictation workstation:   [id number]      XR knee right 4+ views   Final Result   Degenerative changes without osseous injury evident.        MACRO:   None        Signed by: Yon Wheeler 11/9/2024 12:04 PM   Dictation workstation:   [id number]           Considerations/further MDM:  Patient is a 69-year-old female presenting for evaluation of mechanical fall    Patient is well-appearing in no apparent distress during the visit.  Vital signs are stable.  The patient is ambulatory without difficulty with a cane.  Physical exam findings as described above without obvious injuries or deformities on exam.  No indication for laboratory workup as fall is mechanical.  Imaging studies unremarkable except from the left hip x-ray which did demonstrate an age-indeterminate fragmentation of the greater trochanter.  CT was pursued of the left hip with evidence of an acute nondisplaced greater trochanteric fracture.  I did discuss this imaging  finding with the orthopedic surgeon on-call who recommends weightbearing as tolerated.  Surgery is not indicated for these fractures.  Patient is instructed to follow-up with the orthopedic surgeon in the office for further evaluation of her symptoms.  She was released in good condition.  Return precautions were discussed.   I discussed the imaging findings with the patient at bedside. Patient's questions and concerns were addressed. Patient was released in good condition, discharged with instructions to follow up with primary care provider and appropriate specialist, and to return to ED at any time for worsening symptoms or any other concerns. Patient demonstrates understanding of the findings and the importance of appropriate follow up care.         Procedure  Procedures     Helen Sheppard PA-C  11/09/24 1407

## 2024-11-09 NOTE — ED TRIAGE NOTES
Fell yesterday [place] Grocery store. C/O right knee, left hip and left hand. Pt is on coumadin, did not strike head. Pt ambulatory.

## 2024-11-09 NOTE — Clinical Note
Halima Mckeon was seen and treated in our emergency department on 11/9/2024.  She may return to work on 11/11/2024.       If you have any questions or concerns, please don't hesitate to call.      Helen Sheppard PA-C

## 2024-11-19 ENCOUNTER — OFFICE VISIT (OUTPATIENT)
Dept: ORTHOPEDIC SURGERY | Facility: CLINIC | Age: 69
End: 2024-11-19
Payer: MEDICARE

## 2024-11-19 VITALS — HEIGHT: 65 IN | WEIGHT: 205 LBS | BODY MASS INDEX: 34.16 KG/M2

## 2024-11-19 DIAGNOSIS — M25.561 ACUTE PAIN OF RIGHT KNEE: ICD-10-CM

## 2024-11-19 DIAGNOSIS — S72.115A CLOSED NONDISPLACED FRACTURE OF GREATER TROCHANTER OF LEFT FEMUR, INITIAL ENCOUNTER: ICD-10-CM

## 2024-11-19 DIAGNOSIS — S83.91XA SPRAIN OF RIGHT KNEE, INITIAL ENCOUNTER: ICD-10-CM

## 2024-11-19 DIAGNOSIS — M25.552 LEFT HIP PAIN: Primary | ICD-10-CM

## 2024-11-19 PROCEDURE — 3050F LDL-C >= 130 MG/DL: CPT | Performed by: ORTHOPAEDIC SURGERY

## 2024-11-19 PROCEDURE — 99213 OFFICE O/P EST LOW 20 MIN: CPT | Performed by: ORTHOPAEDIC SURGERY

## 2024-11-19 PROCEDURE — 3044F HG A1C LEVEL LT 7.0%: CPT | Performed by: ORTHOPAEDIC SURGERY

## 2024-11-19 PROCEDURE — 3008F BODY MASS INDEX DOCD: CPT | Performed by: ORTHOPAEDIC SURGERY

## 2024-11-19 PROCEDURE — 1036F TOBACCO NON-USER: CPT | Performed by: ORTHOPAEDIC SURGERY

## 2024-11-19 PROCEDURE — 1125F AMNT PAIN NOTED PAIN PRSNT: CPT | Performed by: ORTHOPAEDIC SURGERY

## 2024-11-19 PROCEDURE — 1159F MED LIST DOCD IN RCRD: CPT | Performed by: ORTHOPAEDIC SURGERY

## 2024-11-19 PROCEDURE — 99203 OFFICE O/P NEW LOW 30 MIN: CPT | Performed by: ORTHOPAEDIC SURGERY

## 2024-11-19 ASSESSMENT — LIFESTYLE VARIABLES
AUDIT-C TOTAL SCORE: 0
SKIP TO QUESTIONS 9-10: 1
HOW OFTEN DURING THE LAST YEAR HAVE YOU HAD A FEELING OF GUILT OR REMORSE AFTER DRINKING: NEVER
HOW OFTEN DURING THE LAST YEAR HAVE YOU BEEN UNABLE TO REMEMBER WHAT HAPPENED THE NIGHT BEFORE BECAUSE YOU HAD BEEN DRINKING: NEVER
HAS A RELATIVE, FRIEND, DOCTOR, OR ANOTHER HEALTH PROFESSIONAL EXPRESSED CONCERN ABOUT YOUR DRINKING OR SUGGESTED YOU CUT DOWN: NO
AUDIT TOTAL SCORE: 0
HOW MANY STANDARD DRINKS CONTAINING ALCOHOL DO YOU HAVE ON A TYPICAL DAY: PATIENT DOES NOT DRINK
HOW OFTEN DO YOU HAVE SIX OR MORE DRINKS ON ONE OCCASION: NEVER
HAVE YOU OR SOMEONE ELSE BEEN INJURED AS A RESULT OF YOUR DRINKING: NO
HOW OFTEN DURING THE LAST YEAR HAVE YOU FOUND THAT YOU WERE NOT ABLE TO STOP DRINKING ONCE YOU HAD STARTED: NEVER
HOW OFTEN DURING THE LAST YEAR HAVE YOU FAILED TO DO WHAT WAS NORMALLY EXPECTED FROM YOU BECAUSE OF DRINKING: NEVER
HOW OFTEN DURING THE LAST YEAR HAVE YOU NEEDED AN ALCOHOLIC DRINK FIRST THING IN THE MORNING TO GET YOURSELF GOING AFTER A NIGHT OF HEAVY DRINKING: NEVER
HOW OFTEN DO YOU HAVE A DRINK CONTAINING ALCOHOL: NEVER

## 2024-11-19 ASSESSMENT — COLUMBIA-SUICIDE SEVERITY RATING SCALE - C-SSRS
2. HAVE YOU ACTUALLY HAD ANY THOUGHTS OF KILLING YOURSELF?: NO
1. IN THE PAST MONTH, HAVE YOU WISHED YOU WERE DEAD OR WISHED YOU COULD GO TO SLEEP AND NOT WAKE UP?: NO
6. HAVE YOU EVER DONE ANYTHING, STARTED TO DO ANYTHING, OR PREPARED TO DO ANYTHING TO END YOUR LIFE?: NO

## 2024-11-19 ASSESSMENT — PATIENT HEALTH QUESTIONNAIRE - PHQ9
SUM OF ALL RESPONSES TO PHQ9 QUESTIONS 1 AND 2: 0
2. FEELING DOWN, DEPRESSED OR HOPELESS: NOT AT ALL
1. LITTLE INTEREST OR PLEASURE IN DOING THINGS: NOT AT ALL

## 2024-11-19 ASSESSMENT — PAIN - FUNCTIONAL ASSESSMENT: PAIN_FUNCTIONAL_ASSESSMENT: 0-10

## 2024-11-19 ASSESSMENT — PAIN SCALES - GENERAL
PAINLEVEL_OUTOF10: 8
PAINLEVEL_OUTOF10: 8

## 2024-11-19 ASSESSMENT — ENCOUNTER SYMPTOMS
DEPRESSION: 0
LOSS OF SENSATION IN FEET: 0
OCCASIONAL FEELINGS OF UNSTEADINESS: 0

## 2024-11-19 ASSESSMENT — PAIN DESCRIPTION - DESCRIPTORS: DESCRIPTORS: SHARP

## 2024-11-19 NOTE — PROGRESS NOTES
Subjective      Chief Complaint   Patient presents with    Left Hip - Pain        Past Surgical History:   Procedure Laterality Date    HERNIA REPAIR  2014    Hernia Repair    KNEE SURGERY  2014    Knee Surgery    OTHER SURGICAL HISTORY  2019    Inguinal hernia repair    OTHER SURGICAL HISTORY  2019     section    OTHER SURGICAL HISTORY  2019    Cervical cerclage placement    OTHER SURGICAL HISTORY  2019    Knee replacement        Past Medical History:   Diagnosis Date    Encounter for general adult medical examination without abnormal findings 10/11/2018    Encounter for annual health examination    Encounter for general adult medical examination without abnormal findings     Encounter for annual health examination    Encounter for immunization 10/15/2018    Need for shingles vaccine    Personal history of other diseases of the circulatory system 2014    History of hypertension    Personal history of other diseases of the circulatory system 2018    History of atrial fibrillation    Personal history of other diseases of the circulatory system 10/23/2015    History of atrial fibrillation    Personal history of other specified conditions 2013    History of heartburn    Unspecified tear of unspecified meniscus, current injury, unspecified knee, initial encounter 2013    Acute meniscal tear of knee        HPI  This 69 year old patient presents today with left hip and right knee pain (8/10). The patient states that this left hip pain was due to a fall at the grocery store on 24. She was evaluated in the emergency room at [facility]. She had xrays and a CT scan of the left hip as well as xrays of the right knee. The patient states that the left hip and right knee pain are  worse with and aggravated by  getting from a sitting to a standing position and also walking and standing. The patient states that this left hip and right knee pain impairs their  ability to complete normal activities of daily living. The patient has tried tylenol and ibuprofen with no relief.    Allergies   Allergen Reactions    Ace Inhibitors Other     Cough      Body mass index is 34.11 kg/m².     ROS  CARDIOLOGY:   Negative for chest pain, shortness of breath.   RESPIRATORY:   Negative for chest pain, shortness of breath.   MUSCULOSKELETAL:   See HPI for details.   NEUROLOGY:   Negative for tingling, numbness, weakness.    Objective      Body mass index is 34.11 kg/m².     Physical Exam  GENERAL:          General Appearance:  This is a pleasant patient with appropriate affect, in no acute distress.   DERMATOLOGY:          Skin: skin at the neck, upper and lower back, and trunk is intact. There is no evidence of skin rash, skin breakdown or ulceration, or atrophic skin change.   EXTREMITIES:          Vascular:  Right, left hands and feet are warm with good color and pulses. Right and left calf and thigh are nontender and nonswollen.   NEUROLOGICAL:          Orientation:  Patient is alert and oriented to person, place, time and situation. Right and left upper and lower extremity motor and sensory examinations are intact.  MUSCULOSKELETAL: Neck: No tenderness. No pain or limitation with range of motion. Back: No tenderness. Straight leg test negative bilaterally. Right hip: nontender. No pain or limitation with ROM. Left hip: There is tenderness at the greater trochanter. There is pain with gentle ROM in flexion and extension at the hip. There is pain with external rotation and abduction. bilateral lower extremity is in good position. Nontender at the calf. Neurovascular is intact. right knee: There is tenderness at the medial and lateral joint lines and pain with range of motion.  Esteban's appears negative.  Anterior drawer and Lachman's tests appear negative.  There is not an effusion present.  Left knee: Nontender.  No pain with range of motion. The patient is seen walking today walking  with a painful gait favoring the  left hip while walking.    CT hip left wo IV contrast    Result Date: 11/9/2024  Interpreted By:  Yon Wheeler, STUDY: CT of the  left hip without intravenous contrast dated  11/9/2024.   INDICATION: Signs/Symptoms:Fall, age-indeterminate fragmentation identified on x-ray, rule out for fracture   COMPARISON: None.   ACCESSION NUMBER(S): [id number]   ORDERING CLINICIAN: LEXUS BURGESS   TECHNIQUE: Axial CT of the   left hip was performed  without intravenous contrast.  Sagittal and coronal 2 dimensional reformats were obtained.   FINDINGS: OSSEOUS STRUCTURES AND JOINTS:   There is a nondisplaced fracture of the periphery of the left greater trochanter with the appearance of at least some relatively sharp margin such as seen at image 34 of the coronal plane. Beam hardening artifact limits assessment of this finding in the axial plane with a degree of sharp margin possibly seen at image 83 the axial plane. No dislocation is evident. Severe degenerative changes seen in the visualized spine. Mild degenerative changes seen of the pubic symphysis in the left SI joint. Mild degenerative changes seen of the left hip.  No joint effusion is evident.   MUSCLES AND TENDONS:   Mild-to-moderate atrophy is seen in the gluteus minimus muscle. Muscles and tendons are otherwise intact as seen on CT.   ASSOCIATED SOFT TISSUES:   Calcified atheromatous disease is seen in the visualized arterial tree.       1. Subtle nondisplaced fracture of the periphery of the left greater trochanter. 2. Degenerative change as above.   MACRO: none   Signed by: Yon Wheeler 11/9/2024 1:02 PM Dictation workstation:   [id number]    XR wrist left 3+ views    Result Date: 11/9/2024  Interpreted By:  Yon Wheeler, STUDY: Left wrist dated  11/9/2024   INDICATION: Signs/Symptoms:Left wrist pain fall   COMPARISON: None.   ACCESSION NUMBER(S): [id number]   ORDERING CLINICIAN: LEXUS BURGESS   TECHNIQUE: Four views of  the left wrist.   FINDINGS: No fracture or dislocation is evident. There is mild triscaphe joint and 1st digit carpometacarpal joint degenerative change. No soft tissue gas or radiopaque foreign body is evident.       No osseous injury is evident.   MACRO: None   Signed by: Yon Wheeler 11/9/2024 12:05 PM Dictation workstation:   [id number]    XR knee right 4+ views    Result Date: 11/9/2024  Interpreted By:  Yon Wheeler, STUDY: Right knee dated  11/9/2024.   INDICATION: Signs/Symptoms:Fall right knee pain   COMPARISON: None.   ACCESSION NUMBER(S): [id number]   ORDERING CLINICIAN: LEXUS BURGESS   TECHNIQUE: Four views of the right knee.   FINDINGS: No fracture or dislocation is evident.  No knee effusion is evident. There is severe lateral femorotibial and patellofemoral and moderate medial femorotibial degenerative change. No soft tissue gas or radiopaque foreign body is evident.       Degenerative changes without osseous injury evident.   MACRO: None   Signed by: Yon Wheeler 11/9/2024 12:04 PM Dictation workstation:   [id number]    XR hip left with pelvis when performed 2 or 3 views    Result Date: 11/9/2024  Interpreted By:  Yon Wheeler, STUDY: Pelvis and left hip and left femur dated 11/9/2024.   INDICATION: Signs/Symptoms:Fall left hip pain   COMPARISON: None.   ACCESSION NUMBER(S): [id number]; [id number]   ORDERING CLINICIAN: LEXUS BURGESS   TECHNIQUE: AP pelvis and  AP and frogleg lateral left hip radiographs and four views of the left femur.   FINDINGS: There is some fragmentation of the periphery of the left greater trochanter degenerative changes seen of the spine. Mild degenerative changes seen of the hips. There is a total knee arthroplasty. There is mild lucency at the bone-cement interface under the tibial tray, which is likely similar to the 06/13/2018 radiographs. Vascular calcifications are seen over the soft tissues.       Age indeterminate fragmentation of the greater  trochanter. Knowledge of any point tenderness to this region may be helpful. CT may be helpful for further assessment.   Signed by: Yon Wheeler 11/9/2024 12:03 PM Dictation workstation:   [id number]    XR femur left 2+ views    Result Date: 11/9/2024  Interpreted By:  Yon Wheeler, STUDY: Pelvis and left hip and left femur dated 11/9/2024.   INDICATION: Signs/Symptoms:Fall left hip pain   COMPARISON: None.   ACCESSION NUMBER(S): [id number]; [id number]   ORDERING CLINICIAN: LEXUS BURGESS   TECHNIQUE: AP pelvis and  AP and frogleg lateral left hip radiographs and four views of the left femur.   FINDINGS: There is some fragmentation of the periphery of the left greater trochanter degenerative changes seen of the spine. Mild degenerative changes seen of the hips. There is a total knee arthroplasty. There is mild lucency at the bone-cement interface under the tibial tray, which is likely similar to the 06/13/2018 radiographs. Vascular calcifications are seen over the soft tissues.       Age indeterminate fragmentation of the greater trochanter. Knowledge of any point tenderness to this region may be helpful. CT may be helpful for further assessment.   Signed by: Yon Wheeler 11/9/2024 12:03 PM Dictation workstation:   [id number]     I reviewed all of the x-rays and imaging studies listed above with the patient in the office today.    Halima was seen today for pain.  Diagnoses and all orders for this visit:  Left hip pain (Primary)  -     Referral to Physical Therapy; Future  Closed nondisplaced fracture of greater trochanter of left femur, initial encounter  -     Referral to Orthopaedic Surgery  -     Referral to Physical Therapy; Future  Acute pain of right knee  -     Referral to Orthopaedic Surgery  -     Referral to Physical Therapy; Future  Sprain of right knee, initial encounter  -     Referral to Physical Therapy; Future      Plan: options are discussed with the patient in detail. The patient is  given a prescription for physical therapy to evaluate and treat with gentle strengthening and ROM exercises with modalities as needed. The patient is instructed regarding activity modification and risk for further injury with falling or trauma, ice, physician directed at home gentle strengthening and ROM exercises, and the appropriate use of Tylenol as needed for pain with its potential adverse reactions and side effects. The patient understands.  Return in 6-8 weeks for reevaluation or sooner as needed. Please note that this report has been produced using speech recognition software.  It may contain errors related to grammar, punctuation or spelling.  Electronically signed, but not reviewed.    Mark Salas MD

## 2024-12-02 ENCOUNTER — APPOINTMENT (OUTPATIENT)
Dept: PHYSICAL THERAPY | Facility: CLINIC | Age: 69
End: 2024-12-02
Payer: MEDICARE

## 2024-12-04 ENCOUNTER — DOCUMENTATION (OUTPATIENT)
Dept: PHARMACY | Facility: HOSPITAL | Age: 69
End: 2024-12-04
Payer: MEDICARE

## 2024-12-04 NOTE — PROGRESS NOTES
"Based on Halima Mckeon 's recent medical history, it looks like Halima Mckeon could be eligible to switch from warfarin to a direct oral anticoagulant (DOAC).    To facilitate this transition smoothly, the [facility] Clinical Pharmacy team is available to assist with the conversion process. The pharmacists can provide support with medication selection, dosing adjustments, financial aid, and patient education to ensure a seamless switch from warfarin to a DOAC.    Warfarin Managing Provider: Dr. Elma Ramos MD  Managing Provider Specialty: Primary Care    If the managing provider is interested in pursuing this option, please see instructions below:     Review patient's chart and ensure patient does not have any contraindications to DOACs. The pharmacy team has already done an initial review; however, we encourage a second opinion from the managing provider.   It is strongly recommended that the office staff inform the patient that a referral will be made to the clinical pharmacy team to manage their anticoagulation therapy change. This warm hand off significantly increases the chances of the patient scheduling and working with the pharmacist.   Enter referral to clinical pharmacy as indicated below:   Add Order: \"Referral to Clinical Pharmacy ([id number])  Clinical Pharmacy Service?: Pharmacy Order  Which specialty are you referring to?: Primary Care  Comments: Please indicate reason for referral (warfarin conversion) and any medication preferences (Eliquis versus Xarleto)  The patient will be contacted to set up a telemedicine visit with one of our clinical pharmacists to begin conversion process.   Once the pharmacist meets with the patient, the referring provider will receive ongoing updates.    If the managing provider has any questions, please feel free to reach out.     Kishor Ferraro, PharmD     "

## 2024-12-09 ENCOUNTER — APPOINTMENT (OUTPATIENT)
Dept: PHYSICAL THERAPY | Facility: CLINIC | Age: 69
End: 2024-12-09
Payer: MEDICARE

## 2024-12-17 ENCOUNTER — APPOINTMENT (OUTPATIENT)
Dept: PRIMARY CARE | Facility: CLINIC | Age: 69
End: 2024-12-17
Payer: MEDICARE

## 2024-12-20 ENCOUNTER — LAB (OUTPATIENT)
Dept: LAB | Facility: LAB | Age: 69
End: 2024-12-20
Payer: MEDICARE

## 2024-12-20 DIAGNOSIS — E78.5 HYPERLIPIDEMIA, UNSPECIFIED HYPERLIPIDEMIA TYPE: ICD-10-CM

## 2024-12-20 DIAGNOSIS — I82.409 ACUTE EMBOLISM AND THROMBOSIS OF DEEP VEIN OF LOWER EXTREMITY, UNSPECIFIED LATERALITY (MULTI): ICD-10-CM

## 2024-12-20 DIAGNOSIS — I48.91 ATRIAL FIBRILLATION, UNSPECIFIED TYPE (MULTI): ICD-10-CM

## 2024-12-20 DIAGNOSIS — I50.9 CONGESTIVE HEART FAILURE, UNSPECIFIED HF CHRONICITY, UNSPECIFIED HEART FAILURE TYPE: ICD-10-CM

## 2024-12-20 LAB
ALBUMIN SERPL BCP-MCNC: 3.8 G/DL (ref 3.4–5)
ALP SERPL-CCNC: 82 U/L (ref 33–136)
ALT SERPL W P-5'-P-CCNC: 20 U/L (ref 7–45)
ANION GAP SERPL CALCULATED.3IONS-SCNC: 12 MMOL/L (ref 10–20)
AST SERPL W P-5'-P-CCNC: 22 U/L (ref 9–39)
BILIRUB SERPL-MCNC: 0.6 MG/DL (ref 0–1.2)
BUN SERPL-MCNC: 12 MG/DL (ref 6–23)
CALCIUM SERPL-MCNC: 9.4 MG/DL (ref 8.6–10.3)
CHLORIDE SERPL-SCNC: 108 MMOL/L (ref 98–107)
CHOLEST SERPL-MCNC: 202 MG/DL (ref 0–199)
CHOLEST/HDLC SERPL: 3.3 {RATIO}
CO2 SERPL-SCNC: 24 MMOL/L (ref 21–32)
CREAT SERPL-MCNC: 0.99 MG/DL (ref 0.5–1.05)
EGFRCR SERPLBLD CKD-EPI 2021: 62 ML/MIN/1.73M*2
ERYTHROCYTE [DISTWIDTH] IN BLOOD BY AUTOMATED COUNT: 15 % (ref 11.5–14.5)
GLUCOSE SERPL-MCNC: 84 MG/DL (ref 74–99)
HCT VFR BLD AUTO: 44.3 % (ref 36–46)
HDLC SERPL-MCNC: 61.3 MG/DL
HGB BLD-MCNC: 14.6 G/DL (ref 12–16)
LDLC SERPL CALC-MCNC: 118 MG/DL
MCH RBC QN AUTO: 28 PG (ref 26–34)
MCHC RBC AUTO-ENTMCNC: 33 G/DL (ref 32–36)
MCV RBC AUTO: 85 FL (ref 80–100)
NON HDL CHOLESTEROL: 141 MG/DL (ref 0–149)
NRBC BLD-RTO: 0 /100 WBCS (ref 0–0)
PLATELET # BLD AUTO: 252 X10*3/UL (ref 150–450)
POTASSIUM SERPL-SCNC: 4.1 MMOL/L (ref 3.5–5.3)
PROT SERPL-MCNC: 6.6 G/DL (ref 6.4–8.2)
RBC # BLD AUTO: 5.21 X10*6/UL (ref 4–5.2)
SODIUM SERPL-SCNC: 140 MMOL/L (ref 136–145)
TRIGL SERPL-MCNC: 112 MG/DL (ref 0–149)
VLDL: 22 MG/DL (ref 0–40)
WBC # BLD AUTO: 6.8 X10*3/UL (ref 4.4–11.3)

## 2024-12-20 PROCEDURE — 85027 COMPLETE CBC AUTOMATED: CPT

## 2024-12-20 PROCEDURE — 80053 COMPREHEN METABOLIC PANEL: CPT

## 2024-12-20 PROCEDURE — 36415 COLL VENOUS BLD VENIPUNCTURE: CPT

## 2024-12-20 PROCEDURE — 80061 LIPID PANEL: CPT

## 2024-12-23 ENCOUNTER — APPOINTMENT (OUTPATIENT)
Dept: PRIMARY CARE | Facility: CLINIC | Age: 69
End: 2024-12-23
Payer: MEDICARE

## 2024-12-30 ENCOUNTER — EVALUATION (OUTPATIENT)
Dept: PHYSICAL THERAPY | Facility: CLINIC | Age: 69
End: 2024-12-30
Payer: MEDICARE

## 2024-12-30 DIAGNOSIS — S72.115A CLOSED NONDISPLACED FRACTURE OF GREATER TROCHANTER OF LEFT FEMUR, INITIAL ENCOUNTER: ICD-10-CM

## 2024-12-30 DIAGNOSIS — M25.552 LEFT HIP PAIN: ICD-10-CM

## 2024-12-30 DIAGNOSIS — S83.91XA SPRAIN OF RIGHT KNEE, INITIAL ENCOUNTER: ICD-10-CM

## 2024-12-30 DIAGNOSIS — M25.561 ACUTE PAIN OF RIGHT KNEE: ICD-10-CM

## 2024-12-30 PROCEDURE — 97161 PT EVAL LOW COMPLEX 20 MIN: CPT | Mod: GP | Performed by: PHYSICAL THERAPIST

## 2024-12-30 ASSESSMENT — ENCOUNTER SYMPTOMS
DEPRESSION: 0
LOSS OF SENSATION IN FEET: 0
OCCASIONAL FEELINGS OF UNSTEADINESS: 0

## 2024-12-30 NOTE — PROGRESS NOTES
Physical Therapy Evaluation and Treatment      Patient Name: Halima Mckeon  MRN: [id number]  Today's Date: 12/30/2024  Time Calculation  Start Time: 1045  Stop Time: 1123  Time Calculation (min): 38 min      Insurance:  Visit number: 1 of 6  Authorization info: EVAL only   Insurance Type: Payor: [employer] / Plan: [employer]ICARE / Product Type: *No Product type* /       Current Problem:   1. Closed nondisplaced fracture of greater trochanter of left femur, initial encounter  Referral to Physical Therapy    Follow Up In Physical Therapy      2. Acute pain of right knee  Referral to Physical Therapy    Follow Up In Physical Therapy      3. Left hip pain  Referral to Physical Therapy    Follow Up In Physical Therapy      4. Sprain of right knee, initial encounter  Referral to Physical Therapy    Follow Up In Physical Therapy          Subjective    General:  Pt presents following left hip GT fracture after a trip and fall at [employer] [place] in November.  No other falls. Still having pain in left hip with standing for a long time and walking. Is also having right knee discomfort, will sometimes get popping. Pain when sleeping on left side at night. Cooking, cleaning, and vacuuming is limited in time. Trying to limit how much she is doing during the day. Tylenol sometimes helps. No problems with driving. Hard time walking dog. History of left TKR in 2015.      Precautions: Falls   Pain: 8/10       Objective   ROM   Right knee AROM: WNL with crepitus at endrange     Left hip AROM: All WNL except neutral IR      MMT   B LE strength:  Hip flex 4-/5 L, 4/5 R  Knee flex 4+/5  Knee ext 4+/5      Palpation   TTP right patella. TTP right medial > lateral knee joint line  TTP left hip GT, left PSIS.       Flexibility  Moderate tightness in B hip flexors  Moderate tightness in B hamstrings L>R    Transfers   B UE support for sit to stand from normal chair.    Gait   Ambulates with decreased jody and mildly  antalgic gait. Decreased left hip flexion with mild circumduction. Left LE in ER with decreased stance time.      Stairs   Ascends and descends stairs with step-to-pattern      Outcome Measures:  LEFS: 38/80    Treatments:  Therapeutic Exercise: Access Code [email or web address]  SLR R/L  Bridge  Mini squat  Stand hip ABD R/L        Assessment   Assessment:   Pt is a 69 y.o. female with left hip greater trochanter fracture and right knee OA. Pt with gait deficits, impaired balance, decreased ROM, reduced strength, and flexibility restrictions. Pt will benefit from skilled PT to address the above deficits for improvement in functional activities.       Low complexity due to patient's clinical presentation being stable and uncomplicated by any significant comorbidities that may affect rehab tolerance and progression.     Plan:   Treatment/Interventions: Education/ Instruction, Gait training, Manual therapy, Neuromuscular re-education, Self care/ home management, Therapeutic activities, Therapeutic exercises  PT Plan: Skilled PT  PT Frequency: 1 time per week  Duration: 5 more visits  Number of Treatments Authorized: EVAL only  Rehab Potential: Good  Plan of Care Agreement: Patient      Goals:   Active       Mobility       Goal 1       Start:  12/30/24    Expected End:  03/30/25       Pt will improve left hip AROM to WNL to improve I/ADLs.         Goal 2       Start:  12/30/24    Expected End:  03/30/25       Pt will improve LE strength to 5/5 to improve I/ADLs.            Pain       Goal 1       Start:  12/30/24    Expected End:  03/30/25       Pt will perform all housework with 0/10 pain.         Goal 2       Start:  12/30/24    Expected End:  03/30/25       Pt will stand/walk >30 min with 0/10 pain to improve I/ADLs and be able to walk dog.

## 2025-01-08 ENCOUNTER — DOCUMENTATION (OUTPATIENT)
Dept: PHYSICAL THERAPY | Facility: CLINIC | Age: 70
End: 2025-01-08
Payer: MEDICARE

## 2025-01-08 NOTE — PROGRESS NOTES
Physical Therapy                 Therapy Communication Note    Patient Name: Halima Mckeon  MRN: [id number]  Department:   Room: Room/bed info not found  Today's Date: 1/8/2025     Discipline: Physical Therapy          Missed Visit Reason:      Missed Time: No Show    Comment:   full weight-bearing

## 2025-01-15 ENCOUNTER — APPOINTMENT (OUTPATIENT)
Dept: PHYSICAL THERAPY | Facility: CLINIC | Age: 70
End: 2025-01-15
Payer: MEDICARE

## 2025-01-22 ENCOUNTER — DOCUMENTATION (OUTPATIENT)
Dept: PHYSICAL THERAPY | Facility: CLINIC | Age: 70
End: 2025-01-22
Payer: MEDICARE

## 2025-01-22 NOTE — PROGRESS NOTES
Physical Therapy                 Therapy Communication Note    Patient Name: Halima Mckeon  MRN: [id number]  Department:   Room: Room/bed info not found  Today's Date: 1/22/2025     Discipline: Physical Therapy          Missed Visit Reason:      Missed Time: Cancel    Comment:

## 2025-01-29 ENCOUNTER — DOCUMENTATION (OUTPATIENT)
Dept: PHYSICAL THERAPY | Facility: CLINIC | Age: 70
End: 2025-01-29
Payer: MEDICARE

## 2025-01-29 NOTE — PROGRESS NOTES
Physical Therapy                 Therapy Communication Note    Patient Name: Halima Mckeon  MRN: [id number]  Department:   Room: Room/bed info not found  Today's Date: 1/29/2025     Discipline: Physical Therapy          Missed Visit Reason:  Third missed appointment for patient, per department policy after 3 no shows, will take this patient off of PT schedule.     Missed Time: No Show    Comment:

## 2025-02-05 ENCOUNTER — APPOINTMENT (OUTPATIENT)
Dept: PHYSICAL THERAPY | Facility: CLINIC | Age: 70
End: 2025-02-05
Payer: MEDICARE

## 2025-03-24 ENCOUNTER — OFFICE VISIT (OUTPATIENT)
Dept: CARDIOLOGY | Facility: HOSPITAL | Age: 70
End: 2025-03-24
Payer: MEDICARE

## 2025-03-24 ENCOUNTER — HOSPITAL ENCOUNTER (OUTPATIENT)
Dept: CARDIOLOGY | Facility: HOSPITAL | Age: 70
Discharge: HOME | End: 2025-03-24
Payer: MEDICARE

## 2025-03-24 VITALS
SYSTOLIC BLOOD PRESSURE: 123 MMHG | BODY MASS INDEX: 34.99 KG/M2 | HEIGHT: 65 IN | HEART RATE: 78 BPM | WEIGHT: 210 LBS | OXYGEN SATURATION: 98 % | DIASTOLIC BLOOD PRESSURE: 85 MMHG

## 2025-03-24 DIAGNOSIS — I48.91 ATRIAL FIBRILLATION, UNSPECIFIED TYPE (MULTI): ICD-10-CM

## 2025-03-24 DIAGNOSIS — R00.2 PALPITATIONS: ICD-10-CM

## 2025-03-24 DIAGNOSIS — R00.2 PALPITATIONS: Primary | ICD-10-CM

## 2025-03-24 LAB
ATRIAL RATE: 78 BPM
BODY SURFACE AREA: 2.09 M2
P AXIS: 79 DEGREES
P OFFSET: 209 MS
P ONSET: 161 MS
PR INTERVAL: 112 MS
Q ONSET: 217 MS
QRS COUNT: 13 BEATS
QRS DURATION: 96 MS
QT INTERVAL: 414 MS
QTC CALCULATION(BAZETT): 471 MS
QTC FREDERICIA: 451 MS
R AXIS: 30 DEGREES
T AXIS: 93 DEGREES
T OFFSET: 424 MS
VENTRICULAR RATE: 78 BPM

## 2025-03-24 PROCEDURE — 3079F DIAST BP 80-89 MM HG: CPT | Performed by: INTERNAL MEDICINE

## 2025-03-24 PROCEDURE — 93246 EXT ECG>7D<15D RECORDING: CPT

## 2025-03-24 PROCEDURE — 99214 OFFICE O/P EST MOD 30 MIN: CPT | Performed by: INTERNAL MEDICINE

## 2025-03-24 PROCEDURE — 99214 OFFICE O/P EST MOD 30 MIN: CPT | Mod: 25 | Performed by: INTERNAL MEDICINE

## 2025-03-24 PROCEDURE — 93005 ELECTROCARDIOGRAM TRACING: CPT | Mod: 59 | Performed by: INTERNAL MEDICINE

## 2025-03-24 PROCEDURE — 1036F TOBACCO NON-USER: CPT | Performed by: INTERNAL MEDICINE

## 2025-03-24 PROCEDURE — 1159F MED LIST DOCD IN RCRD: CPT | Performed by: INTERNAL MEDICINE

## 2025-03-24 PROCEDURE — 3074F SYST BP LT 130 MM HG: CPT | Performed by: INTERNAL MEDICINE

## 2025-03-24 PROCEDURE — 3008F BODY MASS INDEX DOCD: CPT | Performed by: INTERNAL MEDICINE

## 2025-03-24 PROCEDURE — 93010 ELECTROCARDIOGRAM REPORT: CPT | Performed by: INTERNAL MEDICINE

## 2025-03-24 RX ORDER — METOPROLOL TARTRATE 25 MG/1
25 TABLET, FILM COATED ORAL 2 TIMES DAILY
COMMUNITY

## 2025-03-24 ASSESSMENT — ENCOUNTER SYMPTOMS
LOSS OF SENSATION IN FEET: 0
OCCASIONAL FEELINGS OF UNSTEADINESS: 0

## 2025-03-24 NOTE — PROGRESS NOTES
Referred by Dragan Rosa MD provider found for   Chief Complaint   Patient presents with    Atrial Fibrillation        Halima Mckeon is a 69 y.o. year old female patient with h/o A Fib s/p RFA 2019. The patient reports having palpitations and thought she was back in A fib. Presents for evaluation.     PMHx/PSHx: As above    FamHx: unremarkable     Allergies:  Allergies   Allergen Reactions    Ace Inhibitors Other     Cough        Review of Systems    Constitutional: not feeling tired.   Eyes: no eyesight problems.   ENT: no hearing loss and no nosebleeds.   Cardiovascular: no intermittent leg claudication and as noted in HPI.   Respiratory: no chronic cough and no shortness of breath.   Gastrointestinal: no change in bowel habits and no blood in stools.   Genitourinary: no urinary frequency and no hematuria.   Skin: no skin rashes.   Neurological: no seizures and no frequent falls.   Psychiatric: no depression and not suicidal.   All other systems have been reviewed and are negative for complaint.     Outpatient Medications:  Current Outpatient Medications   Medication Instructions    albuterol 90 mcg/actuation inhaler 2 puffs, inhalation, 3 times daily RT, EVERY FOUR TO SIX HOURS AS NEEDED    amLODIPine (NORVASC) 10 mg, oral, Daily    aspirin 81 mg EC tablet 1 tablet, Daily    atorvastatin (LIPITOR) 20 mg, oral, Daily    cloNIDine (CATAPRES) 0.2 mg, oral, 2 times daily    diclofenac sodium (VOLTAREN) 4 g, Topical, 4 times daily    fexofenadine (ALLEGRA) 180 mg, Daily    flaxseed oiL 1,000 mg, oral, 3 times daily    fluticasone (Flonase) 50 mcg/actuation nasal spray 1 spray, Each Nostril, Daily, Shake gently. Before first use, prime pump. After use, clean tip and replace cap.    hydrocortisone (hydrocortisone-aloe vera) 1 % cream 1 Application, 2 times daily    metoprolol tartrate (LOPRESSOR) 25 mg, 2 times daily    omeprazole (PRILOSEC) 20 mg, oral, Daily    potassium chloride CR 20 mEq ER tablet 40 mEq,  "oral, 2 times daily    warfarin (COUMADIN) 5 mg, oral, Nightly, 5mg PO daily          Last Recorded Vitals:      4/1/2024     1:54 PM 4/22/2024     2:00 PM 6/21/2024     1:40 PM 10/31/2024    12:12 PM 11/9/2024    10:46 AM 11/19/2024     1:45 PM 3/24/2025     8:01 AM   Vitals   Systolic 136 142 133 119 134  123   Diastolic 84 82 87 75 95  85   BP Location       Left arm   Heart Rate 76 75 72 72 78  78   Temp     36 °C (96.8 °F)     Resp 12 12 12 12 16     Height     1.651 m (5' 5\") 1.651 m (5' 5\") 1.651 m (5' 5\")   Weight (lb) 207 203 206  205 205 210   BMI 34.45 kg/m2 33.78 kg/m2 34.28 kg/m2  34.11 kg/m2 34.11 kg/m2 34.95 kg/m2   BSA (m2) 2.08 m2 2.06 m2 2.07 m2  2.07 m2 2.07 m2 2.09 m2   Visit Report Report Report Report Report  Report Report    Visit Vitals  /85 (BP Location: Left arm, Patient Position: Sitting, BP Cuff Size: Adult)   Pulse 78   Ht 1.651 m (5' 5\")   Wt 95.3 kg (210 lb)   SpO2 98%   BMI 34.95 kg/m²   Smoking Status Never   BSA 2.09 m²        Physical Exam:  Constitutional: alert and in no acute distress.   Eyes: no erythema, swelling or discharge from the eye .   Neck: neck is supple, symmetric, trachea midline, no masses  and no thyromegaly .   Pulmonary: no increased work of breathing or signs of respiratory distress  and lungs clear to auscultation.    Cardiovascular: carotid pulses 2+ bilaterally with no bruit , JVP was normal, no thrills , regular rhythm, normal S1 and S2, no murmurs , pedal pulses 2+ bilaterally  and no edema .   Abdomen: abdomen non-tender, no masses  and no hepatomegaly .   Skin: skin warm and dry, normal skin turgor .   Psychiatric judgment and insight is normal  and oriented to person, place and time .        Assessment/Plan   Problem List Items Addressed This Visit             ICD-10-CM    Atrial fibrillation (Multi) - Primary I48.91    Relevant Medications    metoprolol tartrate (Lopressor) 25 mg tablet    Other Relevant Orders    ECG 12 lead (Clinic Performed) "       Halima Mckeon is a 69 y.o. year old female patient with h/o A Fib s/p RFA 2019. The patient reports having palpitations and thought she was back in A fib. Presents for evaluation.   Her current ECG shows NSR with PACs, narrow QRS and HR of 78 bpm. The patient reports that her palpitations improved since started on metoprolol. She never stopped anticoagulation. It is unclear to me if her symptoms were due to A Fib or not. Will proceed with a heart monitor and follow up with the results.         Dragan Rocha MD  Cardiac Electrophysiology      Thank you very much for allowing me to participate in the care of this pleasant patient. Please do not hesitate to contact me with any further questions or concerns regarding his care.    **Disclaimer: This note was dictated by speech recognition, and every effort has been made to prevent any error in transcription, however minor errors may be present**

## 2025-03-28 ENCOUNTER — APPOINTMENT (OUTPATIENT)
Dept: ORTHOPEDIC SURGERY | Facility: CLINIC | Age: 70
End: 2025-03-28
Payer: MEDICARE

## 2025-04-21 ENCOUNTER — APPOINTMENT (OUTPATIENT)
Dept: GASTROENTEROLOGY | Facility: CLINIC | Age: 70
End: 2025-04-21
Payer: MEDICARE

## 2025-04-21 VITALS — OXYGEN SATURATION: 98 % | HEART RATE: 73 BPM | BODY MASS INDEX: 35.32 KG/M2 | HEIGHT: 65 IN | WEIGHT: 212 LBS

## 2025-04-21 DIAGNOSIS — K21.9 GASTROESOPHAGEAL REFLUX DISEASE, UNSPECIFIED WHETHER ESOPHAGITIS PRESENT: Primary | ICD-10-CM

## 2025-04-21 DIAGNOSIS — Z12.11 COLON CANCER SCREENING: ICD-10-CM

## 2025-04-21 PROCEDURE — 3008F BODY MASS INDEX DOCD: CPT

## 2025-04-21 PROCEDURE — 1159F MED LIST DOCD IN RCRD: CPT

## 2025-04-21 PROCEDURE — 99204 OFFICE O/P NEW MOD 45 MIN: CPT

## 2025-04-21 RX ORDER — AMOXICILLIN AND CLAVULANATE POTASSIUM 875; 125 MG/1; MG/1
875 TABLET, FILM COATED ORAL 2 TIMES DAILY
COMMUNITY
Start: 2025-02-28

## 2025-04-21 RX ORDER — POLYETHYLENE GLYCOL 3350, SODIUM SULFATE ANHYDROUS, SODIUM BICARBONATE, SODIUM CHLORIDE, POTASSIUM CHLORIDE 236; 22.74; 6.74; 5.86; 2.97 G/4L; G/4L; G/4L; G/4L; G/4L
4000 POWDER, FOR SOLUTION ORAL ONCE
Qty: 4000 ML | Refills: 0 | Status: SHIPPED | OUTPATIENT
Start: 2025-04-21 | End: 2025-04-21

## 2025-04-21 RX ORDER — BENZONATATE 100 MG/1
100 CAPSULE ORAL EVERY 8 HOURS PRN
COMMUNITY
Start: 2025-01-17

## 2025-04-21 ASSESSMENT — ENCOUNTER SYMPTOMS
BLOOD IN STOOL: 0
FATIGUE: 0
CHILLS: 0
COUGH: 0
RECTAL PAIN: 0
CONSTIPATION: 0
ANAL BLEEDING: 1
NAUSEA: 0
ABDOMINAL PAIN: 0
DIARRHEA: 0
SHORTNESS OF BREATH: 0
ABDOMINAL DISTENTION: 0
VOMITING: 0
FEVER: 0
TROUBLE SWALLOWING: 0
APPETITE CHANGE: 0

## 2025-04-21 NOTE — PATIENT INSTRUCTIONS
Please schedule your upper endoscopy and colonoscopy. You will need a ride since this involves sedation.  I will send a bowel prep to your pharmacy.  Clear liquid diet the day before the procedure. Start the 1st part of the prep at 6 pm the night prior and the other half 5 hrs before the procedure.    Please hold Coumadin 5 days prior to your procedure.  Confirm with prescribing physician.

## 2025-04-21 NOTE — ASSESSMENT & PLAN NOTE
- Schedule colonoscopy at [facility] with GoLytely prep  -Recommendation to hold Coumadin 5 days prior to procedure

## 2025-04-21 NOTE — ASSESSMENT & PLAN NOTE
Consider esophagitis, gastritis, duodenitis  -Schedule EGD  -Recommendation to hold Coumadin 5 days prior to procedure

## 2025-04-21 NOTE — PROGRESS NOTES
Subjective     History of Present Illness:   Halima Mckeon is a 69 y.o. female with PMHx of diabetes, Dumont's esophagus, CHF, A-fib on Coumadin, CKD who presents to GI clinic for  colonoscopy consultation    Today,  Patient here to discuss screening colonoscopy.  She has heartburn sometimes still on 20 mg daily omeprazole when eating certain foods.  Has had an episode of bright red blood on toilet paper from her hemorrhoid.  Normally moves bowels daily with normal stools.   Denies constipation, diarrhea, melena, dysphagia, unintentional weight loss    Denies ETOH, smoking, marijuana  Denies fxh GI cancer or IBD  Abdominal Surgeries: Hernia,     Last colonoscopy patient reports was over 10 yrs and was negative  Last EGD 2011 Dr. Fuentes.  No report available.  Pathology: Mild chronic gastritis, negative H. pylori, mild chronic inflammation esophagus      Past Medical History  As per HPI.     Social History  she  reports that she has never smoked. She has never been exposed to tobacco smoke. She has never used smokeless tobacco. She reports that she does not drink alcohol and does not use drugs.     Family History  her family history includes Breast cancer (age of onset: 60) in her mother's sister; CARDIAC DISORDER in her father; CERVICAL DYSPLAGIA in her daughter; Cancer in her brother and father; Diabetes in her daughter; Hypertension in her brother, cousin, father, and mother's sister; Lung cancer in her cousin, father's brother, and maternal grandmother; Ovarian cancer (age of onset: 32) in her daughter; Prostate cancer in her brother and cousin.     Review of Systems  Review of Systems   Constitutional:  Negative for appetite change, chills, fatigue and fever.   HENT:  Negative for trouble swallowing.    Respiratory:  Negative for cough and shortness of breath.    Gastrointestinal:  Positive for anal bleeding. Negative for abdominal distention, abdominal pain, blood in stool, constipation, diarrhea,  nausea, rectal pain and vomiting.       Allergies  RX Allergies[1]    Medications  Current Outpatient Medications   Medication Instructions    albuterol 90 mcg/actuation inhaler 2 puffs, inhalation, 3 times daily RT, EVERY FOUR TO SIX HOURS AS NEEDED    amLODIPine (NORVASC) 10 mg, oral, Daily    amoxicillin-clavulanate (Augmentin) 875-125 mg tablet 875 mg, 2 times daily    aspirin 81 mg EC tablet 1 tablet, Daily    atorvastatin (LIPITOR) 20 mg, oral, Daily    benzonatate (TESSALON) 100 mg, Every 8 hours PRN    cloNIDine (CATAPRES) 0.2 mg, oral, 2 times daily    diclofenac sodium (VOLTAREN) 4 g, Topical, 4 times daily    fexofenadine (ALLEGRA) 180 mg, Daily    flaxseed oiL 1,000 mg, oral, 3 times daily    fluticasone (Flonase) 50 mcg/actuation nasal spray 1 spray, Each Nostril, Daily, Shake gently. Before first use, prime pump. After use, clean tip and replace cap.    hydrocortisone (hydrocortisone-aloe vera) 1 % cream 1 Application, 2 times daily    metoprolol tartrate (LOPRESSOR) 25 mg, 2 times daily    omeprazole (PRILOSEC) 20 mg, oral, Daily    polyethylene glycol (Golytely) 236-22.74-6.74 -5.86 gram solution 4,000 mL, oral, Once, Drink 1/2 starting at 6:00 pm the night prior to the procedure and the other 1/2 5 hours prior to the procedure.    potassium chloride CR 20 mEq ER tablet 40 mEq, oral, 2 times daily    warfarin (COUMADIN) 5 mg, oral, Nightly, 5mg PO daily         Objective   Visit Vitals  Pulse 73      Physical Exam  Constitutional:       Appearance: Normal appearance. She is normal weight.   HENT:      Mouth/Throat:      Mouth: Mucous membranes are dry.      Pharynx: Oropharynx is clear.   Cardiovascular:      Rate and Rhythm: Normal rate and regular rhythm.   Pulmonary:      Effort: Pulmonary effort is normal.      Breath sounds: Normal breath sounds. No wheezing or rhonchi.   Abdominal:      General: Abdomen is flat. Bowel sounds are normal. There is no distension.      Palpations: Abdomen is soft.  There is no hepatomegaly.      Tenderness: There is no abdominal tenderness. There is no guarding or rebound. Negative signs include Gore's sign.      Hernia: No hernia is present.   Musculoskeletal:         General: Normal range of motion.   Skin:     General: Skin is warm and dry.   Neurological:      General: No focal deficit present.      Mental Status: She is alert and oriented to person, place, and time.   Psychiatric:         Mood and Affect: Mood normal.         Behavior: Behavior normal.           Lab Results   Component Value Date    WBC 6.8 12/20/2024    WBC 5.5 06/19/2024    WBC 5.5 10/24/2023    HGB 14.6 12/20/2024    HGB 14.0 06/19/2024    HGB 13.0 10/24/2023    HCT 44.3 12/20/2024    HCT 43.0 06/19/2024    HCT 39.9 10/24/2023     12/20/2024     06/19/2024     10/24/2023     Lab Results   Component Value Date     12/20/2024     06/19/2024     02/13/2024    K 4.1 12/20/2024    K 4.3 06/19/2024    K 3.9 02/13/2024     (H) 12/20/2024     (H) 06/19/2024     (H) 02/13/2024    CO2 24 12/20/2024    CO2 24 06/19/2024    CO2 24 02/13/2024    BUN 12 12/20/2024    BUN 16 06/19/2024    BUN 18 02/13/2024    CREATININE 0.99 12/20/2024    CREATININE 1.00 06/19/2024    CREATININE 1.00 02/13/2024    CALCIUM 9.4 12/20/2024    CALCIUM 9.8 06/19/2024    CALCIUM 9.7 02/13/2024    PROT 6.6 12/20/2024    PROT 6.7 06/19/2024    PROT 6.6 02/13/2024    BILITOT 0.6 12/20/2024    BILITOT 0.4 06/19/2024    BILITOT 0.4 02/13/2024    ALKPHOS 82 12/20/2024    ALKPHOS 121 06/19/2024    ALKPHOS 106 02/13/2024    ALT 20 12/20/2024    ALT 16 06/19/2024    ALT 11 02/13/2024    AST 22 12/20/2024    AST 19 06/19/2024    AST 17 02/13/2024    GLUCOSE 84 12/20/2024    GLUCOSE 84 06/19/2024    GLUCOSE 78 02/13/2024           Halima Mckeon is a 69 y.o. female who presents to GI clinic for history of GERD and screening colonoscopy.    Gastroesophageal reflux disease  Consider esophagitis,  gastritis, duodenitis  -Schedule EGD  -Recommendation to hold Coumadin 5 days prior to procedure    Colon cancer screening  - Schedule colonoscopy at [facility] with GoLytely prep  -Recommendation to hold Coumadin 5 days prior to procedure         Karena Bob, APRN-CNP              [1]   Allergies  Allergen Reactions    Ace Inhibitors Other     Cough

## 2025-04-22 LAB — BODY SURFACE AREA: 2.09 M2

## 2025-04-30 ENCOUNTER — APPOINTMENT (OUTPATIENT)
Dept: CARDIOLOGY | Facility: HOSPITAL | Age: 70
End: 2025-04-30
Payer: MEDICARE

## 2025-04-30 DIAGNOSIS — I48.91 ATRIAL FIBRILLATION, UNSPECIFIED TYPE (MULTI): Primary | ICD-10-CM

## 2025-04-30 PROCEDURE — 93010 ELECTROCARDIOGRAM REPORT: CPT | Performed by: INTERNAL MEDICINE

## 2025-04-30 PROCEDURE — 99213 OFFICE O/P EST LOW 20 MIN: CPT | Performed by: INTERNAL MEDICINE

## 2025-04-30 PROCEDURE — 93005 ELECTROCARDIOGRAM TRACING: CPT | Performed by: INTERNAL MEDICINE

## 2025-04-30 PROCEDURE — 1159F MED LIST DOCD IN RCRD: CPT | Performed by: INTERNAL MEDICINE

## 2025-04-30 PROCEDURE — 99212 OFFICE O/P EST SF 10 MIN: CPT

## 2025-04-30 ASSESSMENT — ENCOUNTER SYMPTOMS
OCCASIONAL FEELINGS OF UNSTEADINESS: 0
LOSS OF SENSATION IN FEET: 0
DEPRESSION: 0

## 2025-04-30 NOTE — PROGRESS NOTES
Referred by Dragan Rosa MD provider found for   Chief Complaint   Patient presents with    Atrial Fibrillation        Halima Mckeon is a 69 y.o. year old female patient with h/o A Fib s/p RFA in 2019. Was recently seen due to palpitations and ordered a heart monitor. Presents for follow up with the results.     PMHx/PSHx: As above    FamHx: unremarkable     Allergies:  RX Allergies[1]     Review of Systems    Constitutional: not feeling tired.   Eyes: no eyesight problems.   ENT: no hearing loss and no nosebleeds.   Cardiovascular: no intermittent leg claudication and as noted in HPI.   Respiratory: no chronic cough and no shortness of breath.   Gastrointestinal: no change in bowel habits and no blood in stools.   Genitourinary: no urinary frequency and no hematuria.   Skin: no skin rashes.   Neurological: no seizures and no frequent falls.   Psychiatric: no depression and not suicidal.   All other systems have been reviewed and are negative for complaint.     Outpatient Medications:  Current Outpatient Medications   Medication Instructions    albuterol 90 mcg/actuation inhaler 2 puffs, inhalation, 3 times daily RT, EVERY FOUR TO SIX HOURS AS NEEDED    amLODIPine (NORVASC) 10 mg, oral, Daily    aspirin 81 mg EC tablet 1 tablet, Daily    atorvastatin (LIPITOR) 20 mg, oral, Daily    benzonatate (TESSALON) 100 mg, Every 8 hours PRN    cloNIDine (CATAPRES) 0.2 mg, oral, 2 times daily    diclofenac sodium (VOLTAREN) 4 g, Topical, 4 times daily    fexofenadine (ALLEGRA) 180 mg, Daily    flaxseed oiL 1,000 mg, oral, 3 times daily    hydrocortisone (hydrocortisone-aloe vera) 1 % cream 1 Application, 2 times daily    metoprolol tartrate (LOPRESSOR) 25 mg, 2 times daily    omeprazole (PRILOSEC) 20 mg, oral, Daily    potassium chloride CR 20 mEq ER tablet 40 mEq, oral, 2 times daily    warfarin (COUMADIN) 5 mg, oral, Nightly, 5mg PO daily          Last Recorded Vitals:      4/22/2024     2:00 PM 6/21/2024     1:40 PM  "10/31/2024    12:12 PM 11/9/2024    10:46 AM 11/19/2024     1:45 PM 3/24/2025     8:01 AM 4/21/2025    11:33 AM   Vitals   Systolic 142 133 119 134  123    Diastolic 82 87 75 95  85    BP Location      Left arm    Heart Rate 75 72 72 78  78 73   Temp    36 °C (96.8 °F)      Resp 12 12 12 16      Height    1.651 m (5' 5\") 1.651 m (5' 5\") 1.651 m (5' 5\") 1.651 m (5' 5\")   Weight (lb) 203 206  205 205 210 212   BMI 33.78 kg/m2 34.28 kg/m2  34.11 kg/m2 34.11 kg/m2 34.95 kg/m2 35.28 kg/m2   BSA (m2) 2.06 m2 2.07 m2  2.07 m2 2.07 m2 2.09 m2 2.1 m2   Visit Report Report Report Report  Report Report Report    Visit Vitals  Smoking Status Never        Physical Exam:  Constitutional: alert and in no acute distress.   Eyes: no erythema, swelling or discharge from the eye .   Neck: neck is supple, symmetric, trachea midline, no masses  and no thyromegaly .   Pulmonary: no increased work of breathing or signs of respiratory distress  and lungs clear to auscultation.    Cardiovascular: carotid pulses 2+ bilaterally with no bruit , JVP was normal, no thrills , regular rhythm, normal S1 and S2, no murmurs , pedal pulses 2+ bilaterally  and no edema .   Abdomen: abdomen non-tender, no masses  and no hepatomegaly .   Skin: skin warm and dry, normal skin turgor .   Psychiatric judgment and insight is normal  and oriented to person, place and time .        Assessment/Plan   Problem List Items Addressed This Visit           ICD-10-CM    Atrial fibrillation (Multi) - Primary I48.91    Relevant Orders    ECG 12 lead (Clinic Performed)       Halima Mckeon is a 69 y.o. year old female patient with h/o A Fib s/p RFA in 2019. Was recently seen due to palpitations and ordered a heart monitor. Presents for follow up with the results.   Her current ECG shows NSR with narrow QRS and HR of 68 bpm. The heart monitor showed NSR and no evidence of A Fib. Had short runs of SVT being the longest 12 seconds. She reports doing well and denies any further " symptoms. Will cntinue her current medications and follow up in 1 year.         Dragan Rocha MD  Cardiac Electrophysiology      Thank you very much for allowing me to participate in the care of this pleasant patient. Please do not hesitate to contact me with any further questions or concerns regarding his care.    **Disclaimer: This note was dictated by speech recognition, and every effort has been made to prevent any error in transcription, however minor errors may be present**         [1]   Allergies  Allergen Reactions    Ace Inhibitors Other     Cough

## 2025-05-01 LAB
ATRIAL RATE: 68 BPM
P AXIS: 72 DEGREES
P OFFSET: 210 MS
P ONSET: 161 MS
PR INTERVAL: 114 MS
Q ONSET: 218 MS
QRS COUNT: 11 BEATS
QRS DURATION: 96 MS
QT INTERVAL: 428 MS
QTC CALCULATION(BAZETT): 455 MS
QTC FREDERICIA: 446 MS
R AXIS: 0 DEGREES
T AXIS: 72 DEGREES
T OFFSET: 432 MS
VENTRICULAR RATE: 68 BPM

## 2025-05-23 DIAGNOSIS — Z12.11 COLON CANCER SCREENING: Primary | ICD-10-CM

## 2025-05-23 RX ORDER — POLYETHYLENE GLYCOL 3350, SODIUM SULFATE ANHYDROUS, SODIUM BICARBONATE, SODIUM CHLORIDE, POTASSIUM CHLORIDE 236; 22.74; 6.74; 5.86; 2.97 G/4L; G/4L; G/4L; G/4L; G/4L
4000 POWDER, FOR SOLUTION ORAL ONCE
Qty: 4000 ML | Refills: 0 | Status: SHIPPED | OUTPATIENT
Start: 2025-05-23 | End: 2025-05-23

## 2025-06-02 ENCOUNTER — APPOINTMENT (OUTPATIENT)
Dept: GASTROENTEROLOGY | Facility: HOSPITAL | Age: 70
End: 2025-06-02
Payer: MEDICARE

## 2025-07-27 LAB
ERYTHROCYTE [DISTWIDTH] IN BLOOD BY AUTOMATED COUNT: 15.9 % (ref 11–15)
HCT VFR BLD AUTO: 45.8 % (ref 35–45)
HGB BLD-MCNC: 14.2 G/DL (ref 11.7–15.5)
MCH RBC QN AUTO: 28.6 PG (ref 27–33)
MCHC RBC AUTO-ENTMCNC: 31 G/DL (ref 32–36)
MCV RBC AUTO: 92.3 FL (ref 80–100)
PLATELET # BLD AUTO: 198 THOUSAND/UL (ref 140–400)
PMV BLD REES-ECKER: 9.9 FL (ref 7.5–12.5)
RBC # BLD AUTO: 4.96 MILLION/UL (ref 3.8–5.1)
WBC # BLD AUTO: 5.6 THOUSAND/UL (ref 3.8–10.8)

## 2025-07-30 ENCOUNTER — APPOINTMENT (OUTPATIENT)
Dept: GASTROENTEROLOGY | Facility: HOSPITAL | Age: 70
End: 2025-07-30
Payer: MEDICARE